# Patient Record
Sex: MALE | Race: WHITE | Employment: FULL TIME | URBAN - METROPOLITAN AREA
[De-identification: names, ages, dates, MRNs, and addresses within clinical notes are randomized per-mention and may not be internally consistent; named-entity substitution may affect disease eponyms.]

---
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
## 2017-01-06 DIAGNOSIS — R51.9 ACUTE NONINTRACTABLE HEADACHE, UNSPECIFIED HEADACHE TYPE: ICD-10-CM

## 2017-01-06 DIAGNOSIS — F07.81 POST CONCUSSION SYNDROME: ICD-10-CM

## 2017-01-06 DIAGNOSIS — M54.2 CERVICALGIA: ICD-10-CM

## 2017-01-06 RX ORDER — NORTRIPTYLINE HCL 10 MG
CAPSULE ORAL
Qty: 150 CAPSULE | Refills: 0 | Status: SHIPPED | OUTPATIENT
Start: 2017-01-06 | End: 2017-02-13

## 2017-01-06 NOTE — TELEPHONE ENCOUNTER
Reason for Call:  Medication or medication refill:    Do you use a [facility]?  Name of the pharmacy and phone number for the current request:  [place] [address] [facility] [phone]    Name of the medication requested: nortriptyline (PAMELOR) 10 MG capsule     Other request:     Can we leave a detailed message on this number? YES    Phone number patient can be reached at: Home number on file [phone] (home) or work number [phone]    Best Time: any    Call taken on 1/6/2017 at 10:51 AM by Leti Hoffman

## 2017-01-06 NOTE — TELEPHONE ENCOUNTER
nortriptyline (PAMELOR) 10 MG capsule      Last Written Prescription Date: 12/6/16  Last Fill Quantity: 150, # refills: 0  Last Office Visit with [facility], [facility] or [facility] prescribing provider: 12/27/16   Next 5 appointments (look out 90 days)     Jan 12, 2017  2:40 PM   [facility]   [facility]    [address] [place] [address] [phone]                   BP Readings from Last 3 Encounters:   12/27/16 150/88   12/20/16 142/92   12/13/16 144/80     Last PHQ-9 score on record= No flowsheet data found.

## 2017-01-06 NOTE — TELEPHONE ENCOUNTER
Prescription approved per [facility] Refill Protocol.     Cara Rasmussen RN   January 6, 2017 4:25 PM  [facility] Triage   [phone]

## 2017-01-11 ENCOUNTER — TELEPHONE (OUTPATIENT)
Dept: FAMILY MEDICINE | Facility: CLINIC | Age: 52
End: 2017-01-11

## 2017-01-11 NOTE — TELEPHONE ENCOUNTER
Per pharmacy, the patient's insurance requires a prior authorization for one or more of the patient's medications.  Please initiate prior authorization or call/fax pharmacy to change patient's medication.    Medication: Nortriptyline   Strength: 10mg  Sig: Take 4 tabs at night for 3 days then 5 tabs.    Pharmacy: [facility]  Phone: [phone]  Fax: [phone]    Prescription Insurance:   Phone:   ID:     Additional Comments:

## 2017-01-12 ENCOUNTER — OFFICE VISIT (OUTPATIENT)
Dept: FAMILY MEDICINE | Facility: CLINIC | Age: 52
End: 2017-01-12
Payer: OTHER MISCELLANEOUS

## 2017-01-12 ENCOUNTER — THERAPY VISIT (OUTPATIENT)
Dept: PHYSICAL THERAPY | Facility: CLINIC | Age: 52
End: 2017-01-12
Payer: OTHER MISCELLANEOUS

## 2017-01-12 VITALS
HEIGHT: 72 IN | HEART RATE: 84 BPM | TEMPERATURE: 98.4 F | BODY MASS INDEX: 28.71 KG/M2 | SYSTOLIC BLOOD PRESSURE: 148 MMHG | WEIGHT: 212 LBS | DIASTOLIC BLOOD PRESSURE: 90 MMHG

## 2017-01-12 DIAGNOSIS — F07.81 CONCUSSION SYNDROME: Primary | ICD-10-CM

## 2017-01-12 DIAGNOSIS — M54.2 NECK PAIN: Primary | ICD-10-CM

## 2017-01-12 PROCEDURE — 97110 THERAPEUTIC EXERCISES: CPT | Mod: GP | Performed by: PHYSICAL THERAPIST

## 2017-01-12 PROCEDURE — 99214 OFFICE O/P EST MOD 30 MIN: CPT | Performed by: FAMILY MEDICINE

## 2017-01-12 NOTE — Clinical Note
[facility]  [address] [place] [address] [phone]      January 12, 2017      Shon DE LA CRUZ Lynn  [address] [facility] [address]              To WhomIt May Concern,    Shon Bailey was seen in our clinic today for his post concussion syndrome.  Due to ongoing difficulty we are recommending he decrease his work level to four hours per day.         Sincerely,    Efrain Fatima MD

## 2017-01-12 NOTE — MR AVS SNAPSHOT
After Visit Summary   1/12/2017    Shon Bailey    MRN: [id number]           Patient Information     Date Of Birth          1965        Visit Information        Provider Department      1/12/2017 2:40 PM Efrain Fatima MD [facility]        Today's Diagnoses     Concussion syndrome    -  1       Care Instructions    Orders Placed This Encounter     NEUROLOGY ADULT REFERRAL     Referral Type:  Consultation             Follow-ups after your visit        Additional Services     NEUROLOGY ADULT REFERRAL       Your provider has referred you to: [facility]: Neurology Clinic [facility] [phone]   [email or web address]   post concussion syndrome    Reason for Referral: Consult    Please be aware that coverage of these services is subject to the terms and limitations of your health insurance plan.  Call member services at your health plan with any benefit or coverage questions.      Please bring the following with you to your appointment:    (1) Any X-Rays, CTs or MRIs which have been performed.  Contact the facility where they were done to arrange for  prior to your scheduled appointment.    (2) List of current medications  (3) This referral request   (4) Any documents/labs given to you for this referral                  Your next 10 appointments already scheduled     Jan 25, 2017  1:00 PM   [facility] with Adrian Fajardo PT   [facility] ([facility]    [address]  [facility] [address] [phone]              Who to contact     If you have questions or need follow up information about today's clinic visit or your schedule please contact [facility] directly at [phone].  Normal or non-critical lab and imaging results will be communicated to you by MyChart, letter or phone within 4 business days after the clinic has received the results. If you  "do not hear from us within 7 days, please contact the clinic through Listen Up or phone. If you have a critical or abnormal lab result, we will notify you by phone as soon as possible.  Submit refill requests through [employer] or call your pharmacy and they will forward the refill request to us. Please allow 3 business days for your refill to be completed.          Additional Information About Your Visit        [employer] Information     Listen Up lets you send messages to your doctor, view your test results, renew your prescriptions, schedule appointments and more. To sign up, go to [email or web address] . Click on \"Log in\" on the left side of the screen, which will take you to the Welcome page. Then click on \"Sign up Now\" on the right side of the page.     You will be asked to enter the access code listed below, as well as some personal information. Please follow the directions to create your username and password.     Your access code is: [email or web address]  Expires: 3/6/2017 12:01 PM     Your access code will  in 90 days. If you need help or a new code, please call your [place] clinic or [phone].        Care EveryWhere ID     This is your Care EveryWhere ID. This could be used by other organizations to access your [place] medical records  [id number]        Your Vitals Were     Pulse Temperature Height BMI (Body Mass Index)          84 98.4  F (36.9  C) (Oral) 6' (1.829 m) 28.75 kg/m2         Blood Pressure from Last 3 Encounters:   17 148/90   16 150/88   16 142/92    Weight from Last 3 Encounters:   17 212 lb (96.163 kg)   16 210 lb 6 oz (95.425 kg)   16 212 lb 4 oz (96.276 kg)              We Performed the Following     NEUROLOGY ADULT REFERRAL        Primary Care Provider Office Phone # Fax #    [facility] [phone]       [address] [facility] [address]        Thank you!     Thank you for choosing [facility]  for your care. Our goal is always to provide you with excellent care. Hearing back from our patients is one way we can continue to improve our services. Please take a few minutes to complete the written survey that you may receive in the mail after your visit with us. Thank you!             Your Updated Medication List - Protect others around you: Learn how to safely use, store and throw away your medicines at www.disposemymeds.org.          This list is accurate as of: 1/12/17  3:45 PM.  Always use your most recent med list.                   Brand Name Dispense Instructions for use    cyclobenzaprine 10 MG tablet    FLEXERIL    30 tablet    1/2 to 1 tablet in evenings for muscle spasm       HYDROcodone-acetaminophen 5-325 MG per tablet    NORCO    20 tablet    1 to 2 tablets for headache for 3-4 days       loratadine 10 MG tablet    CLARITIN    30    ONE DAILY       Multi-vitamin Tabs tablet   Generic drug:  multivitamin, therapeutic with minerals      1 TABLET DAILY       nortriptyline 10 MG capsule    PAMELOR    150 capsule    Take 4 tabs at night for 3 days then 5 tabs       VITAMIN D PO      5000 IU daily
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
## 2017-01-12 NOTE — TELEPHONE ENCOUNTER
Called [facility] pharmacy and spoke with pharmacy tech. Was told to disregard PA request for now. Will close encounter.    Guerline Bergman CMA ([facility])

## 2017-01-12 NOTE — Clinical Note
[facility]  [address] [place] [address] [phone]      January 12, 2017      Shon Bailey  [address] [facility] [address]        To whom it may concern:    Shon Bailey was seen in our clinic today for his post concussion syndrome.  Due to ongoing difficulty we are recommending he decrease his work level to hours per day.           Sincerely,    Efrain Fatima MD
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
## 2017-01-19 ENCOUNTER — TRANSFERRED RECORDS (OUTPATIENT)
Dept: HEALTH INFORMATION MANAGEMENT | Facility: CLINIC | Age: 52
End: 2017-01-19

## 2017-01-22 ENCOUNTER — TRANSFERRED RECORDS (OUTPATIENT)
Dept: HEALTH INFORMATION MANAGEMENT | Facility: CLINIC | Age: 52
End: 2017-01-22

## 2017-01-24 ENCOUNTER — THERAPY VISIT (OUTPATIENT)
Dept: PHYSICAL THERAPY | Facility: CLINIC | Age: 52
End: 2017-01-24
Payer: OTHER MISCELLANEOUS

## 2017-01-24 ENCOUNTER — TELEPHONE (OUTPATIENT)
Dept: FAMILY MEDICINE | Facility: CLINIC | Age: 52
End: 2017-01-24

## 2017-01-24 DIAGNOSIS — M54.2 NECK PAIN: Primary | ICD-10-CM

## 2017-01-24 DIAGNOSIS — M54.2 CERVICALGIA: ICD-10-CM

## 2017-01-24 DIAGNOSIS — M62.838 CERVICAL PARASPINAL MUSCLE SPASM: ICD-10-CM
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
## 2017-01-24 PROCEDURE — 97110 THERAPEUTIC EXERCISES: CPT | Mod: GP | Performed by: PHYSICAL THERAPIST

## 2017-01-24 RX ORDER — CYCLOBENZAPRINE HCL 10 MG
TABLET ORAL
Qty: 30 TABLET | Refills: 1 | Status: SHIPPED | OUTPATIENT
Start: 2017-01-24 | End: 2017-03-02

## 2017-01-24 NOTE — TELEPHONE ENCOUNTER
Reason for Call:  Medication or medication refill:    Do you use a [facility]?  Name of the pharmacy and phone number for the current request:  Call patient, this goes through Work Comp     Name of the medication requested: cyclobenzaprine (FLEXERIL) 10 MG tablet    Other request: patient states he would like to have before he goes to PT today, patient was advised refills take up to 72 hours to fill    Can we leave a detailed message on this number? YES    Phone number patient can be reached at: Home number on file [phone] (home)    Best Time: any    Call taken on 1/24/2017 at 7:56 AM by Heidi Leahy

## 2017-02-01 ENCOUNTER — TELEPHONE (OUTPATIENT)
Dept: FAMILY MEDICINE | Facility: CLINIC | Age: 52
End: 2017-02-01

## 2017-02-01 NOTE — TELEPHONE ENCOUNTER
Reason for Call:  Patient request    Detailed comments: Private call before appointment on 2/2/17 with Mathew Islas PA-C, would like to talk with him before appointment at 240 PM    Phone Number Patient can be reached at: Work number on file:  [phone] (work)    Best Time: any time after 7 AM on 2/2    Can we leave a detailed message on this number? YES    Call taken on 2/1/2017 at 11:44 AM by Leti Hoffman

## 2017-02-02 ENCOUNTER — OFFICE VISIT (OUTPATIENT)
Dept: FAMILY MEDICINE | Facility: CLINIC | Age: 52
End: 2017-02-02
Payer: OTHER MISCELLANEOUS

## 2017-02-02 VITALS
DIASTOLIC BLOOD PRESSURE: 84 MMHG | WEIGHT: 216 LBS | BODY MASS INDEX: 29.26 KG/M2 | HEIGHT: 72 IN | HEART RATE: 76 BPM | SYSTOLIC BLOOD PRESSURE: 148 MMHG | TEMPERATURE: 98.4 F

## 2017-02-02 DIAGNOSIS — S06.9X0S TRAUMATIC BRAIN INJURY, WITHOUT LOSS OF CONSCIOUSNESS, SEQUELA (H): ICD-10-CM

## 2017-02-02 DIAGNOSIS — R51.9 NONINTRACTABLE HEADACHE, UNSPECIFIED CHRONICITY PATTERN, UNSPECIFIED HEADACHE TYPE: Primary | ICD-10-CM

## 2017-02-02 DIAGNOSIS — M54.2 NECK PAIN: ICD-10-CM

## 2017-02-02 PROCEDURE — 99214 OFFICE O/P EST MOD 30 MIN: CPT | Performed by: PHYSICIAN ASSISTANT

## 2017-02-02 RX ORDER — ZONISAMIDE 25 MG/1
25 CAPSULE ORAL
COMMUNITY
End: 2017-03-02

## 2017-02-02 ASSESSMENT — ANXIETY QUESTIONNAIRES
3. WORRYING TOO MUCH ABOUT DIFFERENT THINGS: NEARLY EVERY DAY
2. NOT BEING ABLE TO STOP OR CONTROL WORRYING: NEARLY EVERY DAY
6. BECOMING EASILY ANNOYED OR IRRITABLE: NEARLY EVERY DAY
1. FEELING NERVOUS, ANXIOUS, OR ON EDGE: NEARLY EVERY DAY
5. BEING SO RESTLESS THAT IT IS HARD TO SIT STILL: SEVERAL DAYS
GAD7 TOTAL SCORE: 19
7. FEELING AFRAID AS IF SOMETHING AWFUL MIGHT HAPPEN: NEARLY EVERY DAY

## 2017-02-02 ASSESSMENT — PATIENT HEALTH QUESTIONNAIRE - PHQ9: 5. POOR APPETITE OR OVEREATING: NEARLY EVERY DAY

## 2017-02-02 NOTE — NURSING NOTE
Chief Complaint   Patient presents with     Work Comp       Initial /84 mmHg  Pulse 76  Temp(Src) 98.4  F (36.9  C) (Oral)  Ht 6' (1.829 m)  Wt 216 lb (97.977 kg)  BMI 29.29 kg/m2 Estimated body mass index is 29.29 kg/(m^2) as calculated from the following:    Height as of this encounter: 6' (1.829 m).    Weight as of this encounter: 216 lb (97.977 kg).  BP completed using cuff size: geni Alcaraz, Certified Medical Assistant (AAMA)

## 2017-02-02 NOTE — Clinical Note
[facility]  [address] [place] [id number] [phone]      February 2, 2017      RE: Shon Bailey  [facility] [address]              To Whom it May Concern:    Shon will be having an infusion for his headaches in the next week or two and will need that day off of work to get that completed     Sincerely,    Isaiah Islas, MPAS, PA-C

## 2017-02-02 NOTE — MR AVS SNAPSHOT
"              After Visit Summary   2/2/2017    Shon Bailey    MRN: [id number]           Patient Information     Date Of Birth          1965        Visit Information        Provider Department      2/2/2017 2:40 PM Isaiah Islas PA-C [facility]        Today's Diagnoses     Nonintractable headache, unspecified chronicity pattern, unspecified headache type    -  1     Neck pain            Follow-ups after your visit        Your next 10 appointments already scheduled     Feb 09, 2017 11:40 AM   [facility] with Adrian Fajardo PT   [facility] ([facility]    [address]  [place] [address] [phone]              Who to contact     If you have questions or need follow up information about today's clinic visit or your schedule please contact [facility] directly at [phone].  Normal or non-critical lab and imaging results will be communicated to you by MyChart, letter or phone within 4 business days after the clinic has received the results. If you do not hear from us within 7 days, please contact the clinic through [employer] or phone. If you have a critical or abnormal lab result, we will notify you by phone as soon as possible.  Submit refill requests through [employer] or call your pharmacy and they will forward the refill request to us. Please allow 3 business days for your refill to be completed.          Additional Information About Your Visit        [employer] Information     [employer] lets you send messages to your doctor, view your test results, renew your prescriptions, schedule appointments and more. To sign up, go to [email or web address] . Click on \"Log in\" on the left side of the screen, which will take you to the Welcome page. Then click on \"Sign up Now\" on the right side of the page.     You will be asked to enter the access code listed below, as well as some personal information. " Please follow the directions to create your username and password.     Your access code is: [email or web address]  Expires: 3/6/2017 12:01 PM     Your access code will  in 90 days. If you need help or a new code, please call your [place] clinic or [phone].        Care EveryWhere ID     This is your Care EveryWhere ID. This could be used by other organizations to access your [place] medical records  [id number]        Your Vitals Were     Pulse Temperature Height BMI (Body Mass Index)          76 98.4  F (36.9  C) (Oral) 6' (1.829 m) 29.29 kg/m2         Blood Pressure from Last 3 Encounters:   17 148/84   17 148/90   16 150/88    Weight from Last 3 Encounters:   17 216 lb (97.977 kg)   17 212 lb (96.163 kg)   16 210 lb 6 oz (95.425 kg)              Today, you had the following     No orders found for display       Primary Care Provider Office Phone # Fax #    [facility] [phone]       [address] [place] [address]        Thank you!     Thank you for choosing [facility]  for your care. Our goal is always to provide you with excellent care. Hearing back from our patients is one way we can continue to improve our services. Please take a few minutes to complete the written survey that you may receive in the mail after your visit with us. Thank you!             Your Updated Medication List - Protect others around you: Learn how to safely use, store and throw away your medicines at www.disposemymeds.org.          This list is accurate as of: 17  3:42 PM.  Always use your most recent med list.                   Brand Name Dispense Instructions for use    cyclobenzaprine 10 MG tablet    FLEXERIL    30 tablet    1/2 to 1 tablet in evenings for muscle spasm       loratadine 10 MG tablet    CLARITIN    30    ONE DAILY       Multi-vitamin Tabs tablet   Generic drug:  multivitamin, therapeutic with minerals      1  TABLET DAILY       nortriptyline 10 MG capsule    PAMELOR    150 capsule    Take 4 tabs at night for 3 days then 5 tabs       VITAMIN D PO      5000 IU daily       zonisamide 25 MG capsule    Zonegran     Take 25 mg by mouth 2 times daily

## 2017-02-02 NOTE — PROGRESS NOTES
SUBJECTIVE:                                                    Shon Bailey is a 51 year old male who presents to clinic today for the following health issues:    Follow up work comp issues - great deal of improvement with migraines, depression is worsening - lots of crying, hyperventilating, mood swings. This seems to have started when he started Zonisamide. He has seen Neurology and a full evaluation was completed by [facility]. They did MRI neck and head which was normal. They did what sounds like a headache management IV protocol with steroids and he had what sounds like 60-70% improvement in headaches immediately. He continues to have some difficulty with concentration and with long days at work with headache flares. He has some brain cloudiness but those are all improving. His biggest concern is mood issues, crying since starting zonisamide. He's meant to go up on the dose soon but he's not sure he can tolerate that.     Otherwise, he's having continued slow improvement - has follow up with [facility] per their recommended plan and schedule.    Patient Active Problem List   Diagnosis     Benign neoplasm     CARDIOVASCULAR SCREENING; LDL GOAL LESS THAN 160     Thrombosed external hemorrhoids     Neck pain      Current Outpatient Prescriptions   Medication     zonisamide (ZONEGRAN) 25 MG capsule     cyclobenzaprine (FLEXERIL) 10 MG tablet     nortriptyline (PAMELOR) 10 MG capsule     MULTI-VITAMIN PO TABS     VITAMIN D PO     LORATADINE 10 MG OR TABS     No current facility-administered medications for this visit.        Problem list and histories reviewed & adjusted, as indicated.  Additional history: as documented    Problem list, Medication list, Allergies, and Medical/Social/Surgical histories reviewed in EPIC and updated as appropriate.    ROS:  Constitutional, HEENT, cardiovascular, pulmonary, gi and gu systems are negative, except as otherwise noted.    OBJECTIVE:                                                     /84 mmHg  Pulse 76  Temp(Src) 98.4  F (36.9  C) (Oral)  Ht 6' (1.829 m)  Wt 216 lb (97.977 kg)  BMI 29.29 kg/m2  Body mass index is 29.29 kg/(m^2).  GENERAL: healthy, alert and no distress  EYES: Eyes grossly normal to inspection, PERRL and conjunctivae and sclerae normal  NECK: no adenopathy, no asymmetry, masses, or scars and thyroid normal to palpation  MS: Tender spasms of the levator, rhomboid, thoracic paraspinal muscle groups and the cervical paraspinal muscle groups   NEURO: Normal strength and tone, sensory exam grossly normal, mentation intact, cranial nerves 2-12 intact and gait normal including heel/toe/tandem walking    Diagnostic Test Results:  none      ASSESSMENT/PLAN:                                                        ICD-10-CM    1. Nonintractable headache, unspecified chronicity pattern, unspecified headache type R51    2. Neck pain M54.2    3. Traumatic brain injury, without loss of consciousness, sequela (H) S06.9X0S       Having continued improvement. His mood lability I think is the medication that he was started on by neuro. I want him to contact the neurology clinic to discuss that. He's on it a few weeks, that might improve. Recommend he get continued slow improvements, self cares, massage, PT, see neurology per their recommendations, consider acupuncture, will not change meds today. He will see neuro for that now. I spent 35 minutes with Shon over 50% of which was counseling and education. Follow up as needed.    Isaiah Islas, MPAS, PA-C
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
## 2017-02-03 ASSESSMENT — ANXIETY QUESTIONNAIRES: GAD7 TOTAL SCORE: 19

## 2017-02-03 ASSESSMENT — PATIENT HEALTH QUESTIONNAIRE - PHQ9: SUM OF ALL RESPONSES TO PHQ QUESTIONS 1-9: 19

## 2017-02-09 ENCOUNTER — THERAPY VISIT (OUTPATIENT)
Dept: PHYSICAL THERAPY | Facility: CLINIC | Age: 52
End: 2017-02-09
Payer: OTHER MISCELLANEOUS

## 2017-02-09 DIAGNOSIS — M54.2 NECK PAIN: Primary | ICD-10-CM

## 2017-02-09 PROCEDURE — 97110 THERAPEUTIC EXERCISES: CPT | Mod: GP | Performed by: PHYSICAL THERAPIST

## 2017-02-09 NOTE — PROGRESS NOTES
Subjective:    HPI                    Objective:    System    Physical Exam    General     ROS    Assessment/Plan:      DISCHARGE REPORT    Progress reporting period is from 11.30.16 to 2.9.17.       SUBJECTIVE  Subjective changes noted by patient:  Pt states that neck pain is improving and more intermittent.  Feels like ROM has improved and rotating neck side to side and driving is easier.  Still getting low grade headaches but is better with meds and getting infusions.  Has been consistent with doing neck extension every 3 hrs.    Current Pain level: 0/10.     Initial Pain level:  (3-8/10 pain varying intensity).   Changes in function:  Yes (See Goal flowsheet attached for changes in current functional level)  Adverse reaction to treatment or activity: None    OBJECTIVE  C/S AROM:  Flex WNL; Ext min loss; Rotation min loss (B).  Ext and rotation improve after repeated ext w/OP.     ASSESSMENT/PLAN  Updated problem list and treatment plan: Diagnosis 1:  Neck Pain    Pain -  self management, education, directional preference exercise and home program  Decreased ROM/flexibility - therapeutic exercise and home program  Decreased joint mobility - therapeutic exercise and home program  Impaired muscle performance - neuro re-education and home program  STG/LTGs have been met or progress has been made towards goals:  Yes (See Goal flow sheet completed today.)  Assessment of Progress: The patient's condition is improving.  Self Management Plans:  Patient is independent in a home treatment program.  Patient is independent in self management of symptoms.  I have re-evaluated this patient and find that the nature, scope, duration and intensity of the therapy is appropriate for the medical condition of the patient.  Shon continues to require the following intervention to meet STG and LTG's:  PT intervention is no longer required to meet STG/LTG.    Recommendations:  This patient is ready to be discharged from therapy and  continue their home treatment program.    Please refer to the daily flowsheet for treatment today, total treatment time and time spent performing 1:1 timed codes.

## 2017-02-13 DIAGNOSIS — M54.2 CERVICALGIA: ICD-10-CM

## 2017-02-13 DIAGNOSIS — F07.81 POST CONCUSSION SYNDROME: ICD-10-CM

## 2017-02-13 DIAGNOSIS — R51.9 ACUTE NONINTRACTABLE HEADACHE, UNSPECIFIED HEADACHE TYPE: ICD-10-CM
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
## 2017-02-13 NOTE — TELEPHONE ENCOUNTER
nortriptyline (PAMELOR) 10 MG capsule     Last Written Prescription Date: 1-6-2017  Last Fill Quantity: 150, # refills: 0  Last Office Visit with [facility], [facility] or [facility] prescribing provider: 2-2-2017   Next 5 appointments (look out 90 days)     Feb 16, 2017  2:40 PM CST   [facility]-C   [facility]    [address] [place] [address] [phone]                   BP Readings from Last 3 Encounters:   02/02/17 148/84   01/12/17 148/90   12/27/16 150/88     Last PHQ-9 score on record=   PHQ-9 SCORE 2/2/2017   Total Score 19

## 2017-02-14 RX ORDER — NORTRIPTYLINE HCL 10 MG
CAPSULE ORAL
Qty: 150 CAPSULE | Refills: 0 | Status: SHIPPED | OUTPATIENT
Start: 2017-02-14 | End: 2017-04-18 | Stop reason: DRUGHIGH

## 2017-02-14 NOTE — TELEPHONE ENCOUNTER
Prescription approved per [facility] Refill Protocol. Patient is seeing neuro.   Penny Barnard RN

## 2017-02-16 ENCOUNTER — OFFICE VISIT (OUTPATIENT)
Dept: FAMILY MEDICINE | Facility: CLINIC | Age: 52
End: 2017-02-16
Payer: OTHER MISCELLANEOUS

## 2017-02-16 VITALS
HEIGHT: 72 IN | SYSTOLIC BLOOD PRESSURE: 128 MMHG | WEIGHT: 214 LBS | BODY MASS INDEX: 28.99 KG/M2 | DIASTOLIC BLOOD PRESSURE: 86 MMHG | HEART RATE: 96 BPM | TEMPERATURE: 99.3 F

## 2017-02-16 DIAGNOSIS — R51.9 NONINTRACTABLE EPISODIC HEADACHE, UNSPECIFIED HEADACHE TYPE: ICD-10-CM

## 2017-02-16 DIAGNOSIS — S06.9X0D TBI (TRAUMATIC BRAIN INJURY), WITHOUT LOSS OF CONSCIOUSNESS, SUBSEQUENT ENCOUNTER: Primary | ICD-10-CM

## 2017-02-16 PROCEDURE — 99214 OFFICE O/P EST MOD 30 MIN: CPT | Performed by: PHYSICIAN ASSISTANT

## 2017-02-16 NOTE — NURSING NOTE
Patient wanted this to be in record.  [facility] - noted that Mathew thought all of the neurologist recommendations were excessive.  That is not what the patient recalls hearing at the visit.    Marycarmen Alcaraz, Certified Medical Assistant (AAMA)     Chief Complaint   Patient presents with     Headache       Initial /86 (Cuff Size: Adult Large)  Pulse 96  Temp 99.3  F (37.4  C) (Oral)  Ht 6' (1.829 m)  Wt 214 lb (97.1 kg)  BMI 29.02 kg/m2 Estimated body mass index is 29.02 kg/(m^2) as calculated from the following:    Height as of this encounter: 6' (1.829 m).    Weight as of this encounter: 214 lb (97.1 kg).  Medication Reconciliation: complete   Marycarmen Alcaraz Certified Medical Assistant (AAMA)

## 2017-02-16 NOTE — MR AVS SNAPSHOT
"              After Visit Summary   2/16/2017    Shon Bailey    MRN: [id number]           Patient Information     Date Of Birth          1965        Visit Information        Provider Department      2/16/2017 2:40 PM Isaiah Islas PA-C [facility]        Today's Diagnoses     TBI (traumatic brain injury), without loss of consciousness, subsequent encounter    -  1    Nonintractable episodic headache, unspecified headache type          Care Instructions    1. Wean off Zonisamide - go to 2 pills for 3 days, then 1 pill for 3 days, then stop   2. See me in 1 to 2 weeks   3. Consider Topiramate (Topamax) at our follow up   4. Stay on Nortriptyline           Follow-ups after your visit        Who to contact     If you have questions or need follow up information about today's clinic visit or your schedule please contact [facility] directly at [phone].  Normal or non-critical lab and imaging results will be communicated to you by [employer], letter or phone within 4 business days after the clinic has received the results. If you do not hear from us within 7 days, please contact the clinic through [employer] or phone. If you have a critical or abnormal lab result, we will notify you by phone as soon as possible.  Submit refill requests through [employer] or call your pharmacy and they will forward the refill request to us. Please allow 3 business days for your refill to be completed.          Additional Information About Your Visit        [employer] Information     [employer] lets you send messages to your doctor, view your test results, renew your prescriptions, schedule appointments and more. To sign up, go to [email or web address] . Click on \"Log in\" on the left side of the screen, which will take you to the Welcome page. Then click on \"Sign up Now\" on the right side of the page.     You will be asked to enter the access code listed below, as well as some personal information. " Please follow the directions to create your username and password.     Your access code is: [email or web address]  Expires: 3/6/2017 12:01 PM     Your access code will  in 90 days. If you need help or a new code, please call your [place] clinic or [phone].        Care EveryWhere ID     This is your Care EveryWhere ID. This could be used by other organizations to access your [place] medical records  [id number]        Your Vitals Were     Pulse Temperature Height BMI (Body Mass Index)          96 99.3  F (37.4  C) (Oral) 6' (1.829 m) 29.02 kg/m2         Blood Pressure from Last 3 Encounters:   17 128/86   17 148/84   17 148/90    Weight from Last 3 Encounters:   17 214 lb (97.1 kg)   17 216 lb (98 kg)   17 212 lb (96.2 kg)              Today, you had the following     No orders found for display       Primary Care Provider Office Phone # Fax #    [facility] [phone]       [address] [place] [address]        Thank you!     Thank you for choosing [facility]  for your care. Our goal is always to provide you with excellent care. Hearing back from our patients is one way we can continue to improve our services. Please take a few minutes to complete the written survey that you may receive in the mail after your visit with us. Thank you!             Your Updated Medication List - Protect others around you: Learn how to safely use, store and throw away your medicines at www.disposemymeds.org.          This list is accurate as of: 17  3:42 PM.  Always use your most recent med list.                   Brand Name Dispense Instructions for use    cyclobenzaprine 10 MG tablet    FLEXERIL    30 tablet    1/2 to 1 tablet in evenings for muscle spasm       loratadine 10 MG tablet    CLARITIN    30    ONE DAILY       Multi-vitamin Tabs tablet   Generic drug:  multivitamin, therapeutic with minerals      1 TABLET DAILY        nortriptyline 10 MG capsule    PAMELOR    150 capsule    Take 4 tabs at night for 3 days then 5 tabs       VITAMIN D PO      5000 IU daily       zonisamide 25 MG capsule    Zonegran     Take 25 mg by mouth 2 times daily

## 2017-02-16 NOTE — LETTER
[facility]  [address] [facility] [id number] [phone]      February 16, 2017      RE: Shon DE LA CRUZ Lynn  [facility] [address]              To Whom it May Concern:    Shon was seen today in clinic for follow up headaches / injury. He's having continued headaches.     I want him on 6 hour work days to continue - no shifts longer than 6 hours for the next two weeks. I will reevaluate him 3/2/2017 for reevaluation.     Sincerely,    Isaiah Islas, RODOLFOS, PA-C

## 2017-02-16 NOTE — PATIENT INSTRUCTIONS
1. Wean off Zonisamide - go to 2 pills for 3 days, then 1 pill for 3 days, then stop   2. See me in 1 to 2 weeks   3. Consider Topiramate (Topamax) at our follow up   4. Stay on Nortriptyline
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
## 2017-02-16 NOTE — LETTER
[facility]  [address] [facility] [address] [phone]      February 16, 2017      RE: Shon DE LA CRUZ Lynn  [facility] [address]          To Whom it May Concern:    Shon was seen today in clinic for follow up headaches / injury. He's having continued headaches.     I want him on 6 hour work days to continue - no shifts longer than 6 hours for the next two weeks. I will reevaluate him 3/2/2017 for reevaluation.     I'd like him to also take an as needed 5-10 minute break out of every hour if his headaches are flaring.     Sincerely,    Isaiah Islas, LANRE, PA-C

## 2017-03-02 ENCOUNTER — OFFICE VISIT (OUTPATIENT)
Dept: FAMILY MEDICINE | Facility: CLINIC | Age: 52
End: 2017-03-02
Payer: OTHER MISCELLANEOUS

## 2017-03-02 VITALS
TEMPERATURE: 98.8 F | WEIGHT: 218 LBS | SYSTOLIC BLOOD PRESSURE: 134 MMHG | HEART RATE: 88 BPM | DIASTOLIC BLOOD PRESSURE: 80 MMHG | BODY MASS INDEX: 29.53 KG/M2 | HEIGHT: 72 IN
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
## 2017-03-02 DIAGNOSIS — S06.9X0S TBI (TRAUMATIC BRAIN INJURY), WITHOUT LOSS OF CONSCIOUSNESS, SEQUELA: ICD-10-CM

## 2017-03-02 DIAGNOSIS — M54.2 CERVICALGIA: ICD-10-CM

## 2017-03-02 DIAGNOSIS — M62.838 CERVICAL PARASPINAL MUSCLE SPASM: ICD-10-CM

## 2017-03-02 PROCEDURE — 99214 OFFICE O/P EST MOD 30 MIN: CPT | Performed by: PHYSICIAN ASSISTANT

## 2017-03-02 RX ORDER — CYCLOBENZAPRINE HCL 10 MG
TABLET ORAL
Qty: 30 TABLET | Refills: 5 | Status: SHIPPED | OUTPATIENT
Start: 2017-03-02 | End: 2017-09-11

## 2017-03-02 NOTE — LETTER
[facility]  [address] [place] [address]  [phone]      March 2, 2017      RE: Shon DE LA CRUZ Lynn  [facility] [facility] [address]              RE: To Whom it May Concern:    Shon will move to 8 hour shifts starting Monday, March 6, 2017. We will continue that until 3/14/2017 when we will reevaluate him to determine further shift adjustments.     Sincerely,    LANRE Elder, PAKelseyC

## 2017-03-02 NOTE — NURSING NOTE
Chief Complaint   Patient presents with     Headache     Follow up       Initial /80 (BP Location: Right arm, Cuff Size: Adult Large)  Pulse 88  Temp 98.8  F (37.1  C) (Oral)  Ht 6' (1.829 m)  Wt 218 lb (98.9 kg)  BMI 29.57 kg/m2 Estimated body mass index is 29.57 kg/(m^2) as calculated from the following:    Height as of this encounter: 6' (1.829 m).    Weight as of this encounter: 218 lb (98.9 kg).  Medication Reconciliation: complete     Shasha Cullen MA
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
## 2017-03-03 PROBLEM — S06.9X0S: Status: ACTIVE | Noted: 2017-03-03
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
## 2017-03-13 ENCOUNTER — OFFICE VISIT (OUTPATIENT)
Dept: FAMILY MEDICINE | Facility: CLINIC | Age: 52
End: 2017-03-13
Payer: OTHER MISCELLANEOUS

## 2017-03-13 VITALS
WEIGHT: 219.13 LBS | HEIGHT: 72 IN | DIASTOLIC BLOOD PRESSURE: 92 MMHG | TEMPERATURE: 98.3 F | HEART RATE: 80 BPM | BODY MASS INDEX: 29.68 KG/M2 | SYSTOLIC BLOOD PRESSURE: 162 MMHG
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
## 2017-03-13 DIAGNOSIS — R51.9 NONINTRACTABLE HEADACHE, UNSPECIFIED CHRONICITY PATTERN, UNSPECIFIED HEADACHE TYPE: ICD-10-CM

## 2017-03-13 DIAGNOSIS — S06.9X0S TBI (TRAUMATIC BRAIN INJURY), WITHOUT LOSS OF CONSCIOUSNESS, SEQUELA: ICD-10-CM

## 2017-03-13 DIAGNOSIS — F41.9 ANXIETY: ICD-10-CM

## 2017-03-13 PROCEDURE — 99214 OFFICE O/P EST MOD 30 MIN: CPT | Performed by: PHYSICIAN ASSISTANT

## 2017-03-13 RX ORDER — DIAZEPAM 2 MG
TABLET ORAL
Qty: 20 TABLET | Refills: 0 | Status: SHIPPED | OUTPATIENT
Start: 2017-03-13 | End: 2017-04-18

## 2017-03-13 RX ORDER — ESCITALOPRAM OXALATE 10 MG/1
10 TABLET ORAL DAILY
Qty: 30 TABLET | Refills: 1 | Status: SHIPPED | OUTPATIENT
Start: 2017-03-13 | End: 2017-03-27

## 2017-03-13 ASSESSMENT — PAIN SCALES - GENERAL: PAINLEVEL: SEVERE PAIN (7)

## 2017-03-13 NOTE — PATIENT INSTRUCTIONS
1. Look online to order Thera-Cane ([employer])   2. [facility]. Look online for appointment times   3. For the next few days, do Diazepam (Valium) 2 to 3 mg at bedtime for spasm. Do not take Flexeril while on Valium  4. Okay to wean down on Nortriptyline if you want, 3 for a week, 2 for a week - see me then
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
## 2017-03-13 NOTE — NURSING NOTE
Handed patient 1 printed out prescription for:    diazepam (VALIUM) 2 MG tablet    Candy Barnard, CMA

## 2017-03-13 NOTE — NURSING NOTE
Chief Complaint   Patient presents with     Headache     follow up       Initial There were no vitals taken for this visit. Estimated body mass index is 29.57 kg/(m^2) as calculated from the following:    Height as of 3/2/17: 6' (1.829 m).    Weight as of 3/2/17: 218 lb (98.9 kg).  Medication Reconciliation: complete   Candy Barnard, CMA

## 2017-03-14 ENCOUNTER — TELEPHONE (OUTPATIENT)
Dept: FAMILY MEDICINE | Facility: CLINIC | Age: 52
End: 2017-03-14

## 2017-03-14 NOTE — TELEPHONE ENCOUNTER
Called and spoke with a tech at [facility]. They said this request should have gone to his Work Comp insurance. We can disregard this request.    Shasha Cullen MA

## 2017-03-14 NOTE — TELEPHONE ENCOUNTER
Per faxed request recieved, the patient's insurance requires a prior authorization for one or more of the patient's medications.  Please initiate prior authorization or call/fax pharmacy to change patient's medication.    Medication: diazepam (VALIUM) 2 MG tablet   Prescription Insurance: Cover My Meds  Phone: request received via [facility]    Additional Comments: Fax received and placed in MA folder    Janis Hoffman,

## 2017-03-27 ENCOUNTER — OFFICE VISIT (OUTPATIENT)
Dept: FAMILY MEDICINE | Facility: CLINIC | Age: 52
End: 2017-03-27
Payer: OTHER MISCELLANEOUS

## 2017-03-27 ENCOUNTER — TRANSFERRED RECORDS (OUTPATIENT)
Dept: HEALTH INFORMATION MANAGEMENT | Facility: CLINIC | Age: 52
End: 2017-03-27

## 2017-03-27 VITALS
HEIGHT: 72 IN | SYSTOLIC BLOOD PRESSURE: 160 MMHG | HEART RATE: 84 BPM | WEIGHT: 217 LBS | BODY MASS INDEX: 29.39 KG/M2 | DIASTOLIC BLOOD PRESSURE: 94 MMHG | TEMPERATURE: 99 F
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
## 2017-03-27 DIAGNOSIS — S06.9X0D TBI (TRAUMATIC BRAIN INJURY), WITHOUT LOSS OF CONSCIOUSNESS, SUBSEQUENT ENCOUNTER: Primary | ICD-10-CM

## 2017-03-27 DIAGNOSIS — R51.9 NONINTRACTABLE EPISODIC HEADACHE, UNSPECIFIED HEADACHE TYPE: ICD-10-CM

## 2017-03-27 PROCEDURE — 99214 OFFICE O/P EST MOD 30 MIN: CPT | Performed by: PHYSICIAN ASSISTANT

## 2017-03-27 RX ORDER — NORTRIPTYLINE HYDROCHLORIDE 50 MG/1
50 CAPSULE ORAL AT BEDTIME
Qty: 90 CAPSULE | Refills: 0 | Status: SHIPPED | OUTPATIENT
Start: 2017-03-27 | End: 2017-06-27
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
## 2017-03-27 NOTE — PATIENT INSTRUCTIONS
1. Strongly advise the Neuropsych evaluation soon - need objective evaluation regarding headaches / mood issues - I placed referral - but can also check with [place] and also check with [facility] - doesn't matter where you do   2. Great idea to see head and neck pain clinic! Hopefully beneficial to you
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
## 2017-03-27 NOTE — NURSING NOTE
Chief Complaint   Patient presents with     Work Comp     recheck headaches       Initial BP (!) 160/94 (Cuff Size: Adult Large)  Pulse 84  Temp 99  F (37.2  C) (Oral)  Ht 6' (1.829 m)  Wt 217 lb (98.4 kg)  BMI 29.43 kg/m2 Estimated body mass index is 29.43 kg/(m^2) as calculated from the following:    Height as of this encounter: 6' (1.829 m).    Weight as of this encounter: 217 lb (98.4 kg).  Medication Reconciliation: osman Alcaraz, Certified Medical Assistant (AAMA)

## 2017-03-28 ENCOUNTER — TRANSFERRED RECORDS (OUTPATIENT)
Dept: HEALTH INFORMATION MANAGEMENT | Facility: CLINIC | Age: 52
End: 2017-03-28

## 2017-04-05 NOTE — PROGRESS NOTES
Chart reviewed.  Encounter was not reviewed with provider.  Patient was not examined by me.  Cara Wright MD

## 2017-04-06 ENCOUNTER — TRANSFERRED RECORDS (OUTPATIENT)
Dept: HEALTH INFORMATION MANAGEMENT | Facility: CLINIC | Age: 52
End: 2017-04-06

## 2017-04-18 ENCOUNTER — OFFICE VISIT (OUTPATIENT)
Dept: FAMILY MEDICINE | Facility: CLINIC | Age: 52
End: 2017-04-18

## 2017-04-18 VITALS
SYSTOLIC BLOOD PRESSURE: 138 MMHG | DIASTOLIC BLOOD PRESSURE: 92 MMHG | BODY MASS INDEX: 28.99 KG/M2 | WEIGHT: 214 LBS | TEMPERATURE: 99.2 F | HEIGHT: 72 IN | HEART RATE: 88 BPM
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
## 2017-04-18 DIAGNOSIS — R51.9 NONINTRACTABLE EPISODIC HEADACHE, UNSPECIFIED HEADACHE TYPE: ICD-10-CM

## 2017-04-18 DIAGNOSIS — S06.9X0S TBI (TRAUMATIC BRAIN INJURY), WITHOUT LOSS OF CONSCIOUSNESS, SEQUELA: Primary | ICD-10-CM

## 2017-04-18 DIAGNOSIS — F07.81 POST CONCUSSION SYNDROME: ICD-10-CM

## 2017-04-18 PROCEDURE — 99215 OFFICE O/P EST HI 40 MIN: CPT | Performed by: PHYSICIAN ASSISTANT

## 2017-04-18 NOTE — NURSING NOTE
Chief Complaint   Patient presents with     Headache     Work Comp       Initial BP (!) 138/92 (Cuff Size: Adult Large)  Pulse 88  Temp 99.2  F (37.3  C) (Oral)  Ht 6' (1.829 m)  Wt 214 lb (97.1 kg)  BMI 29.02 kg/m2 Estimated body mass index is 29.02 kg/(m^2) as calculated from the following:    Height as of this encounter: 6' (1.829 m).    Weight as of this encounter: 214 lb (97.1 kg).  Medication Reconciliation: osman Alcaraz, Certified Medical Assistant (AAMA)

## 2017-04-18 NOTE — MR AVS SNAPSHOT
"              After Visit Summary   2017    Shon Bailey    MRN: [id number]           Patient Information     Date Of Birth          1965        Visit Information        Provider Department      2017 3:00 PM Isaiah Islas PA-C [facility]         Follow-ups after your visit        Who to contact     If you have questions or need follow up information about today's clinic visit or your schedule please contact [facility] directly at [phone].  Normal or non-critical lab and imaging results will be communicated to you by MyChart, letter or phone within 4 business days after the clinic has received the results. If you do not hear from us within 7 days, please contact the clinic through [employer] or phone. If you have a critical or abnormal lab result, we will notify you by phone as soon as possible.  Submit refill requests through [employer] or call your pharmacy and they will forward the refill request to us. Please allow 3 business days for your refill to be completed.          Additional Information About Your Visit        MyChart Information     [employer] lets you send messages to your doctor, view your test results, renew your prescriptions, schedule appointments and more. To sign up, go to [email or web address] . Click on \"Log in\" on the left side of the screen, which will take you to the Welcome page. Then click on \"Sign up Now\" on the right side of the page.     You will be asked to enter the access code listed below, as well as some personal information. Please follow the directions to create your username and password.     Your access code is: [email or web address]  Expires: 2017  4:36 PM     Your access code will  in 90 days. If you need help or a new code, please call your [place] clinic or [phone].        Care EveryWhere ID     This is your Care EveryWhere ID. This could be used by other organizations to access your [place] medical " records  [id number]        Your Vitals Were     Pulse Temperature Height BMI (Body Mass Index)          88 99.2  F (37.3  C) (Oral) 6' (1.829 m) 29.02 kg/m2         Blood Pressure from Last 3 Encounters:   04/18/17 (!) 138/92   03/27/17 (!) 160/94   03/13/17 (!) 162/92    Weight from Last 3 Encounters:   04/18/17 214 lb (97.1 kg)   03/27/17 217 lb (98.4 kg)   03/13/17 219 lb 2 oz (99.4 kg)              Today, you had the following     No orders found for display         Today's Medication Changes          These changes are accurate as of: 4/18/17  4:00 PM.  If you have any questions, ask your nurse or doctor.               These medicines have changed or have updated prescriptions.        Dose/Directions    nortriptyline 50 MG capsule   Commonly known as:  PAMELOR   This may have changed:  Another medication with the same name was removed. Continue taking this medication, and follow the directions you see here.   Used for:  Nonintractable episodic headache, unspecified headache type   Changed by:  Isaiah Islsa PA-C        Dose:  50 mg   Take 1 capsule (50 mg) by mouth At Bedtime   Quantity:  90 capsule   Refills:  0                Primary Care Provider Office Phone # Fax #    [place] [facility] [phone]       [address] [place] [address]        Thank you!     Thank you for choosing [facility]  for your care. Our goal is always to provide you with excellent care. Hearing back from our patients is one way we can continue to improve our services. Please take a few minutes to complete the written survey that you may receive in the mail after your visit with us. Thank you!             Your Updated Medication List - Protect others around you: Learn how to safely use, store and throw away your medicines at www.disposemymeds.org.          This list is accurate as of: 4/18/17  4:00 PM.  Always use your most recent med list.                   Brand Name  Dispense Instructions for use    cyclobenzaprine 10 MG tablet    FLEXERIL    30 tablet    1/2 to 1 tablet in evenings for muscle spasm       loratadine 10 MG tablet    CLARITIN    30    ONE DAILY       Multi-vitamin Tabs tablet   Generic drug:  multivitamin, therapeutic with minerals      1 TABLET DAILY       nortriptyline 50 MG capsule    PAMELOR    90 capsule    Take 1 capsule (50 mg) by mouth At Bedtime       VERAPAMIL HCL PO      Take by mouth daily       VITAMIN D PO      5000 IU daily
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
## 2017-04-18 NOTE — LETTER
[facility]  [address] [place] [id number] [phone]      April 18, 2017      RE: Shon Bailey  [facility] [facility] [address]              To Whom it May Concern:    Shon can continue to work his current full schedule at work with no time restrictions. However, I want him to take 5 minutes out of every 1 hour to stretch, rest, walk around and massage his neck and head.     Sincerely,

## 2017-04-24 PROBLEM — F07.81 POST CONCUSSION SYNDROME: Status: ACTIVE | Noted: 2017-04-24

## 2017-05-16 ENCOUNTER — OFFICE VISIT (OUTPATIENT)
Dept: NEUROPSYCHOLOGY | Facility: CLINIC | Age: 52
End: 2017-05-16

## 2017-05-16 DIAGNOSIS — S13.9XXS SPRAIN OF NECK, SEQUELA: ICD-10-CM

## 2017-05-16 DIAGNOSIS — F09 COGNITIVE DISORDER: Primary | ICD-10-CM

## 2017-05-16 NOTE — MR AVS SNAPSHOT
After Visit Summary   2017    Shon Bailey    MRN: [id number]           Patient Information     Date Of Birth          1965        Visit Information        Provider Department      2017 12:30 PM [facility]        Today's Diagnoses     Cognitive disorder    -  1    Sprain of neck, sequela           Follow-ups after your visit        Your next 10 appointments already scheduled     2017  4:40 PM CDT   SHORT with Isaiah Islas PA-C   [facility]    [address] [place] [id number] [phone]              Who to contact     Please call your clinic at [phone] to:    Ask questions about your health    Make or cancel appointments    Discuss your medicines    Learn about your test results    Speak to your doctor   If you have compliments or concerns about an experience at your clinic, or if you wish to file a complaint, please contact [facility] Patient Relations at [phone] or email us at [email or web address]         Additional Information About Your Visit        MyChart Information     Connect[employer]me is an electronic gateway that provides easy, online access to your medical records. With Connect2me, you can request a clinic appointment, read your test results, renew a prescription or communicate with your care team.     To sign up for [employer] visit the website at [email or web address]   You will be asked to enter the access code listed below, as well as some personal information. Please follow the directions to create your username and password.     Your access code is: [email or web address]  Expires: 2017  4:36 PM     Your access code will  in 90 days. If you need help or a new code, please contact your [facility] Clinic or call [phone] for assistance.        Care EveryWhere ID     This is your Care  EveryWhere ID. This could be used by other organizations to access your [place] medical records  [id number]         Blood Pressure from Last 3 Encounters:   05/30/17 132/86   04/18/17 (!) 138/92   03/27/17 (!) 160/94    Weight from Last 3 Encounters:   05/30/17 99.3 kg (219 lb)   04/18/17 97.1 kg (214 lb)   03/27/17 98.4 kg (217 lb)              We Performed the Following     [id number]-MIYARKOICH TESTING, PER HR/PSYCHOLOGIST     NEUROPSYCH TESTING BY [facility]        Primary Care Provider Office Phone # Fax #    [facility] [phone]       [address] [facility] [address]        Thank you!     Thank you for choosing [facility] NEUROPSYCHOLOGY  for your care. Our goal is always to provide you with excellent care. Hearing back from our patients is one way we can continue to improve our services. Please take a few minutes to complete the written survey that you may receive in the mail after your visit with us. Thank you!             Your Updated Medication List - Protect others around you: Learn how to safely use, store and throw away your medicines at www.disposemymeds.org.          This list is accurate as of: 5/16/17 11:59 PM.  Always use your most recent med list.                   Brand Name Dispense Instructions for use    cyclobenzaprine 10 MG tablet    FLEXERIL    30 tablet    1/2 to 1 tablet in evenings for muscle spasm       loratadine 10 MG tablet    CLARITIN    30    ONE DAILY       Multi-vitamin Tabs tablet   Generic drug:  multivitamin, therapeutic with minerals      1 TABLET DAILY       nortriptyline 50 MG capsule    PAMELOR    90 capsule    Take 1 capsule (50 mg) by mouth At Bedtime       VERAPAMIL HCL PO      Take by mouth daily       VITAMIN D PO      5000 IU daily

## 2017-05-30 ENCOUNTER — OFFICE VISIT (OUTPATIENT)
Dept: FAMILY MEDICINE | Facility: CLINIC | Age: 52
End: 2017-05-30

## 2017-05-30 VITALS
BODY MASS INDEX: 29.66 KG/M2 | WEIGHT: 219 LBS | SYSTOLIC BLOOD PRESSURE: 132 MMHG | TEMPERATURE: 98.4 F | HEIGHT: 72 IN | DIASTOLIC BLOOD PRESSURE: 86 MMHG | HEART RATE: 94 BPM

## 2017-05-30 DIAGNOSIS — S06.9X0S TBI (TRAUMATIC BRAIN INJURY), WITHOUT LOSS OF CONSCIOUSNESS, SEQUELA: Primary | ICD-10-CM

## 2017-05-30 DIAGNOSIS — F07.81 POST CONCUSSION SYNDROME: ICD-10-CM
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
## 2017-05-30 PROCEDURE — 99214 OFFICE O/P EST MOD 30 MIN: CPT | Performed by: PHYSICIAN ASSISTANT
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
## 2017-05-30 NOTE — NURSING NOTE
Chief Complaint   Patient presents with     Work Comp     Follow up on headaches        Initial /86 (BP Location: Right arm, Cuff Size: Adult Large)  Pulse 94  Temp 98.4  F (36.9  C) (Oral)  Ht 6' (1.829 m)  Wt 219 lb (99.3 kg)  BMI 29.7 kg/m2 Estimated body mass index is 29.7 kg/(m^2) as calculated from the following:    Height as of this encounter: 6' (1.829 m).    Weight as of this encounter: 219 lb (99.3 kg).  Medication Reconciliation: complete     Guerline Bergman CMA (AAMA)
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
## 2017-05-31 NOTE — PROGRESS NOTES
[employer] ADULT INTELLIGENCE SCALE-IV TRAIL MAKING TEST                             Scaled Score     Digit Span  11    Time Errors   Vocabulary  13    A  27            1    Arithmetic  12    B  97            0    Symbol Search  11      Coding    7   CONTROLLED WORD ASSOCIATION TEST       [facility] MEMORY SCALES Score  35        Logical Mem Immediate               10/10  LETTER CANCELLATION TEST   Logical Mem 30 Minute    5/6     Visual Repr Immediate    11   Time   307   Errors           0    Visual Repr 30       7     30 Minute Recognition      4  PSYCHOMOTOR TESTS         Right    Left   PHAM VERBAL LEARNING TEST- REVISED Finger Tapping  59     55     Grooved Pegboard  75   104    Trial 1                             7                                                     Drops: 0          Drops: 0     Trial 2                             8               Trial 3                           10           [place] NAMING TEST                                                                                 25  Recall                       9            Score          58    25  Recognition            10                 MAKE A FIGURE TEST   STROOP COLOR-WORD TEST           Score    41                                T-Score    Word               38         TOMM   Color               35         Color-Word             31          Trial 1  50      [place] CARD SORTING TEST-1 DECK Trial 2    50            # of categories   5         # perseverative   7             BLAIR VISUAL-ORGANIZATION TEST            Raw score   29
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
## 2017-06-07 ENCOUNTER — TELEPHONE (OUTPATIENT)
Dept: FAMILY MEDICINE | Facility: CLINIC | Age: 52
End: 2017-06-07

## 2017-06-07 DIAGNOSIS — F07.81 POST CONCUSSION SYNDROME: Primary | ICD-10-CM

## 2017-06-07 NOTE — TELEPHONE ENCOUNTER
Reason for Call:  Medication or medication refill:    Do you use a [facility]?  Name of the pharmacy and phone number for the current request:  [facility], [address] [phone]    Name of the medication requested: verapamil- 40mg 3 times daily    Other request: the last time patient saw Mathew Islas he had suggested that he get this medication filled through him/ Patients neurologist/ [facility]- was the doctor who prescribed this medication.    Can we leave a detailed message on this number? YES    Phone number patient can be reached at: Work number on file:  [phone] (work)    Best Time: any    Call taken on 6/7/2017 at 12:34 PM by Anita Barney

## 2017-06-08 RX ORDER — VERAPAMIL HYDROCHLORIDE 40 MG/1
TABLET ORAL
Qty: 270 TABLET | Refills: 1 | Status: SHIPPED | OUTPATIENT
Start: 2017-06-08 | End: 2017-10-31

## 2017-06-08 NOTE — TELEPHONE ENCOUNTER
Reviewed. Ok to fill. Please confirm the dose is correct at 40 mg, 3 times daily - appears it was confirmed from the pharmacy.  Isaiah Islas MPAS, PA-C

## 2017-06-08 NOTE — TELEPHONE ENCOUNTER
Telephone call to [facility] pharmacy and confirmed that his previous orders for verapamil were for 40mg three times daily.  Rhonda Napier RN  Triage  [facility]  [phone]

## 2017-06-26 ENCOUNTER — TELEPHONE (OUTPATIENT)
Dept: FAMILY MEDICINE | Facility: CLINIC | Age: 52
End: 2017-06-26

## 2017-06-26 DIAGNOSIS — R51.9 NONINTRACTABLE EPISODIC HEADACHE, UNSPECIFIED HEADACHE TYPE: ICD-10-CM

## 2017-06-26 NOTE — TELEPHONE ENCOUNTER
Reason for call:  Patient reporting a symptom    Symptom or request: Headaches have worsened.  Calling to tegane THADDEUS Islas as requested.    Duration (how long have symptoms been present): 2 weeks    Have you been treated for this before? Yes    Additional comments:  Patient has also made request for medication refill for nortriptyline as well.    Phone Number patient can be reached at:  Home number on file [phone] (home)    Best Time:  Any     Can we leave a detailed message on this number:  YES    Call taken on 6/26/2017 at 5:06 PM by Jose Bernard

## 2017-06-26 NOTE — TELEPHONE ENCOUNTER
Reason for Call:  Medication or medication refill:    Do you use a [facility]?  Name of the pharmacy and phone number for the current request:  [facility], [address], [phone]    Name of the medication requested: nortriptyline    Other request:     Can we leave a detailed message on this number? YES    Phone number patient can be reached at: Home number on file [phone] (home)    Best Time: Any    Call taken on 6/26/2017 at 5:03 PM by Jose Bernard

## 2017-06-27 RX ORDER — NORTRIPTYLINE HYDROCHLORIDE 50 MG/1
50 CAPSULE ORAL AT BEDTIME
Qty: 90 CAPSULE | Refills: 0 | Status: SHIPPED | OUTPATIENT
Start: 2017-06-27 | End: 2017-10-02

## 2017-06-27 NOTE — TELEPHONE ENCOUNTER
Left message on machine requesting call back to gather information regarding th symptoms he is experiencing.  Janine Lawrence RN

## 2017-06-27 NOTE — TELEPHONE ENCOUNTER
"Shon called back to RN voicemail and left a message at 4:32pm. States he doesn't need to talk to a nurse about his headache, just wanted to update Mathew Islas that it is \"a little worse.\" Mathew had asked him to call if that happened. FYI to provider.     Cara Rasmussen RN    "

## 2017-07-25 ENCOUNTER — OFFICE VISIT (OUTPATIENT)
Dept: FAMILY MEDICINE | Facility: CLINIC | Age: 52
End: 2017-07-25

## 2017-07-25 VITALS
HEIGHT: 72 IN | TEMPERATURE: 98.6 F | DIASTOLIC BLOOD PRESSURE: 82 MMHG | BODY MASS INDEX: 29.58 KG/M2 | SYSTOLIC BLOOD PRESSURE: 132 MMHG | WEIGHT: 218.38 LBS | HEART RATE: 88 BPM

## 2017-07-25 DIAGNOSIS — S06.9X0S TRAUMATIC BRAIN INJURY, WITHOUT LOC, SEQUELA: ICD-10-CM

## 2017-07-25 DIAGNOSIS — R51.9 NONINTRACTABLE EPISODIC HEADACHE, UNSPECIFIED HEADACHE TYPE: Primary | ICD-10-CM

## 2017-07-25 DIAGNOSIS — M54.2 NECK PAIN: ICD-10-CM

## 2017-07-25 PROBLEM — S06.9XAA TRAUMATIC BRAIN INJURY (H): Status: ACTIVE | Noted: 2017-02-16

## 2017-07-25 PROCEDURE — 99215 OFFICE O/P EST HI 40 MIN: CPT | Performed by: PHYSICIAN ASSISTANT

## 2017-07-25 RX ORDER — VERAPAMIL HYDROCHLORIDE 180 MG/1
180 TABLET, EXTENDED RELEASE ORAL DAILY
Qty: 90 TABLET | Refills: 0 | Status: SHIPPED | OUTPATIENT
Start: 2017-07-25 | End: 2017-10-31
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
## 2017-09-08 ENCOUNTER — TRANSFERRED RECORDS (OUTPATIENT)
Dept: HEALTH INFORMATION MANAGEMENT | Facility: CLINIC | Age: 52
End: 2017-09-08
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
## 2017-09-11 DIAGNOSIS — M62.838 CERVICAL PARASPINAL MUSCLE SPASM: ICD-10-CM

## 2017-09-11 DIAGNOSIS — M54.2 CERVICALGIA: ICD-10-CM

## 2017-09-11 NOTE — TELEPHONE ENCOUNTER
Medication Detail      Disp Refills Start End ALBIN   cyclobenzaprine (FLEXERIL) 10 MG tablet 30 tablet 5 3/2/2017  No   Si/2 to 1 tablet in evenings for muscle spasm   Class: E-Prescribe   Order: [id number]       Last Office Visit with [facility] or [facility] prescribing provider: 2017  Future Office visit:       Routing refill request to provider for review/approval because:  Drug not on the [facility], [facility] or [facility] refill protocol or controlled substance

## 2017-09-12 RX ORDER — CYCLOBENZAPRINE HCL 10 MG
TABLET ORAL
Qty: 30 TABLET | Refills: 5 | Status: SHIPPED | OUTPATIENT
Start: 2017-09-12 | End: 2018-03-06

## 2017-09-22 ENCOUNTER — TRANSFERRED RECORDS (OUTPATIENT)
Dept: HEALTH INFORMATION MANAGEMENT | Facility: CLINIC | Age: 52
End: 2017-09-22

## 2017-10-02 ENCOUNTER — TELEPHONE (OUTPATIENT)
Dept: FAMILY MEDICINE | Facility: CLINIC | Age: 52
End: 2017-10-02

## 2017-10-02 DIAGNOSIS — R51.9 NONINTRACTABLE EPISODIC HEADACHE, UNSPECIFIED HEADACHE TYPE: ICD-10-CM

## 2017-10-02 NOTE — TELEPHONE ENCOUNTER
nortriptyline  Last Written Prescription Date: 06/27/17  Last Fill Quantity: 90, # refills: 0  Last Office Visit with [facility], [facility] or  Health prescribing provider: 07/25/17 [place]        BP Readings from Last 3 Encounters:   07/25/17 132/82   05/30/17 132/86   04/18/17 (!) 138/92     Last PHQ-9 score on record=   PHQ-9 SCORE 2/2/2017   Total Score 19

## 2017-10-03 NOTE — TELEPHONE ENCOUNTER
Reason for Call:  Other prescription    Detailed comments: Shon called in seeing where his Rx was at and to let Isaiah Islas know his headaches have worsen for about a month now     Phone Number Patient can be reached at: Home number on file [phone] (home)    Best Time:     Can we leave a detailed message on this number? YES    Call taken on 10/3/2017 at 5:00 PM by April Hood

## 2017-10-03 NOTE — TELEPHONE ENCOUNTER
Called and spoke with patient. In March he started verapamil and his headaches were much better for a long time. Now his headaches have been worse for the last month. They have been sharp and shooting. He has concerns about other medications possibly causing his headaches. He's been seeing a neurologist who increased the verapamil which has not helped. He reports that his light sensitivity improved, but now it's worsening again.     Patient wanted to update you regarding all of this. He wonders what the next step should be. Would you like to refill the medication below, or change med? Would you like an office visit or phone visit to further discuss?    Bang Dykes RN

## 2017-10-04 RX ORDER — NORTRIPTYLINE HYDROCHLORIDE 50 MG/1
CAPSULE ORAL
Qty: 90 CAPSULE | Refills: 0 | Status: SHIPPED | OUTPATIENT
Start: 2017-10-04 | End: 2017-12-05

## 2017-10-04 NOTE — TELEPHONE ENCOUNTER
Refills sent. Please call and ask that he follow up. I saw him briefly during his daughter's appointment and it appears he was still having a rough time.  Thanks.  Isaiah Islas

## 2017-10-31 ENCOUNTER — OFFICE VISIT (OUTPATIENT)
Dept: FAMILY MEDICINE | Facility: CLINIC | Age: 52
End: 2017-10-31

## 2017-10-31 VITALS
BODY MASS INDEX: 29.53 KG/M2 | WEIGHT: 218 LBS | SYSTOLIC BLOOD PRESSURE: 136 MMHG | HEART RATE: 88 BPM | HEIGHT: 72 IN | TEMPERATURE: 98.8 F | DIASTOLIC BLOOD PRESSURE: 92 MMHG

## 2017-10-31 DIAGNOSIS — S06.9X0S TRAUMATIC BRAIN INJURY, WITHOUT LOSS OF CONSCIOUSNESS, SEQUELA (H): Primary | ICD-10-CM

## 2017-10-31 DIAGNOSIS — H53.149 SENSITIVENESS TO LIGHT: ICD-10-CM

## 2017-10-31 DIAGNOSIS — F07.81 POST CONCUSSION SYNDROME: ICD-10-CM

## 2017-10-31 DIAGNOSIS — R51.9 CHRONIC NONINTRACTABLE HEADACHE, UNSPECIFIED HEADACHE TYPE: ICD-10-CM

## 2017-10-31 DIAGNOSIS — G89.29 CHRONIC NONINTRACTABLE HEADACHE, UNSPECIFIED HEADACHE TYPE: ICD-10-CM

## 2017-10-31 PROCEDURE — 99214 OFFICE O/P EST MOD 30 MIN: CPT | Performed by: PHYSICIAN ASSISTANT

## 2017-10-31 RX ORDER — VERAPAMIL HYDROCHLORIDE 240 MG/1
240 TABLET, FILM COATED, EXTENDED RELEASE ORAL DAILY
Qty: 90 TABLET | Refills: 1 | COMMUNITY
Start: 2017-10-31 | End: 2018-01-10
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
## 2017-10-31 NOTE — PATIENT INSTRUCTIONS
N: [place] [facility] [phone]   [email or web address]  [place] [phone]   [email or web address]  [place] [phone]   [email or web address]
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
## 2017-10-31 NOTE — NURSING NOTE
Chief Complaint   Patient presents with     Recheck Medication     Headache     recheck        Initial BP (!) 136/92 (BP Location: Right arm, Cuff Size: Adult Regular)  Pulse 88  Temp 98.8  F (37.1  C) (Oral)  Ht 6' (1.829 m)  Wt 218 lb (98.9 kg)  BMI 29.57 kg/m2 Estimated body mass index is 29.57 kg/(m^2) as calculated from the following:    Height as of this encounter: 6' (1.829 m).    Weight as of this encounter: 218 lb (98.9 kg).  Medication Reconciliation: complete     Guerline Bergman CMA (AAMA)

## 2017-11-29 ENCOUNTER — TELEPHONE (OUTPATIENT)
Dept: FAMILY MEDICINE | Facility: CLINIC | Age: 52
End: 2017-11-29

## 2017-11-29 NOTE — TELEPHONE ENCOUNTER
"Called and spoke with wife. She states that patient is \"about to get fired because he can't do his job with his brain issues\". He needs to see PCP ASAP to talk about worker's comp. There are no new problems, but the issues at work are \"escalating\". The catch is that patient can not get off of work to see provider, so his appointment would need to be after 415 pm. Yolanda thinks that a phone appointment would be okay. Will route to PCP. Can you fit patient in during this specified time, or do a phone visit?    Bang Dykes RN    "

## 2017-11-29 NOTE — TELEPHONE ENCOUNTER
Reason for Call:  Other appointment    Detailed comments: Patient would like to be seen for a head injury follow up.    Phone Number Patient can be reached at: Other phone number:    Yolanda Bailey (Spouse) [phone]         Best Time:     Can we leave a detailed message on this number? NO    Call taken on 11/29/2017 at 10:01 AM by Vicky Erazo

## 2017-11-29 NOTE — TELEPHONE ENCOUNTER
If they can do a phone visit at another time - that would be fine. (Thursday / Friday) during the day. Same day or over a 40 minute physical is fine.   If they cannot do a phone visit during the day and it needs to be after 4:15 - next Tuesday would be my only available that late. There's a physical I can double book on.     LANRE Elder, PA-C

## 2017-12-05 ENCOUNTER — OFFICE VISIT (OUTPATIENT)
Dept: FAMILY MEDICINE | Facility: CLINIC | Age: 52
End: 2017-12-05

## 2017-12-05 VITALS
TEMPERATURE: 98.4 F | DIASTOLIC BLOOD PRESSURE: 93 MMHG | BODY MASS INDEX: 29.53 KG/M2 | WEIGHT: 218 LBS | SYSTOLIC BLOOD PRESSURE: 143 MMHG | HEIGHT: 72 IN | HEART RATE: 82 BPM | OXYGEN SATURATION: 97 %

## 2017-12-05 DIAGNOSIS — R51.9 CHRONIC NONINTRACTABLE HEADACHE, UNSPECIFIED HEADACHE TYPE: ICD-10-CM

## 2017-12-05 DIAGNOSIS — S06.9X0S TRAUMATIC BRAIN INJURY, WITHOUT LOSS OF CONSCIOUSNESS, SEQUELA (H): Primary | ICD-10-CM

## 2017-12-05 DIAGNOSIS — M54.2 NECK PAIN: ICD-10-CM

## 2017-12-05 DIAGNOSIS — F07.81 POST CONCUSSION SYNDROME: ICD-10-CM

## 2017-12-05 DIAGNOSIS — F41.0 PANIC ATTACK: ICD-10-CM

## 2017-12-05 DIAGNOSIS — R51.9 NONINTRACTABLE EPISODIC HEADACHE, UNSPECIFIED HEADACHE TYPE: ICD-10-CM

## 2017-12-05 DIAGNOSIS — G89.29 CHRONIC NONINTRACTABLE HEADACHE, UNSPECIFIED HEADACHE TYPE: ICD-10-CM

## 2017-12-05 PROCEDURE — 99215 OFFICE O/P EST HI 40 MIN: CPT | Performed by: PHYSICIAN ASSISTANT

## 2017-12-05 RX ORDER — NORTRIPTYLINE HYDROCHLORIDE 50 MG/1
50 CAPSULE ORAL AT BEDTIME
Qty: 90 CAPSULE | Refills: 1 | Status: SHIPPED | OUTPATIENT
Start: 2017-12-05 | End: 2018-03-06

## 2017-12-05 RX ORDER — ALPRAZOLAM 0.5 MG
TABLET ORAL
Qty: 20 TABLET | Refills: 0 | Status: SHIPPED | OUTPATIENT
Start: 2017-12-05 | End: 2018-03-06

## 2017-12-05 ASSESSMENT — PAIN SCALES - GENERAL: PAINLEVEL: MODERATE PAIN (5)
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
## 2017-12-06 NOTE — PATIENT INSTRUCTIONS
1. [facility] acupuncture - consider being seen  2. [place] [facility] ??   3. Studies being done on TBI?

## 2017-12-06 NOTE — NURSING NOTE
Handed patient 1 printed out prescription for:    ALPRAZolam (XANAX) 0.5 MG tablet    Candy Barnard, CMA
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
## 2017-12-11 ENCOUNTER — TRANSFERRED RECORDS (OUTPATIENT)
Dept: HEALTH INFORMATION MANAGEMENT | Facility: CLINIC | Age: 52
End: 2017-12-11

## 2018-01-08 DIAGNOSIS — F07.81 POST CONCUSSION SYNDROME: ICD-10-CM

## 2018-01-08 DIAGNOSIS — S06.9X0S TRAUMATIC BRAIN INJURY, WITHOUT LOSS OF CONSCIOUSNESS, SEQUELA (H): ICD-10-CM

## 2018-01-08 NOTE — TELEPHONE ENCOUNTER
"Requested Prescriptions   Pending Prescriptions Disp Refills     verapamil (CALAN) 40 MG tablet [Pharmacy Med Name: Verapamil HCl Oral Tablet 40 MG]    Last Written Prescription Date:  10/31/2017  Last Fill Quantity: 90 tabs,  # refills: 1   Last Office Visit with G, P or [facility] prescribing provider:  12/5/2017   Future Office Visit:      270 tablet 0     Sig: TAKE 1 TABLET BY MOUTH 3 TIMES DAILY    Calcium Channel Blockers Protocol  Failed    1/8/2018  7:01 AM       Failed - Blood pressure under 140/90    BP Readings from Last 3 Encounters:   12/05/17 (!) 143/93   10/31/17 (!) 136/92   07/25/17 132/82          Failed - Normal ALT in past 12 months    No lab results found.         Failed - Normal serum creatinine on file in past 12 months    No lab results found.         Passed - Recent or future visit with authorizing provider    Patient had office visit in the last year or has a visit in the next 30 days with authorizing provider.  See \"Patient Info\" tab in inbasket, or \"Choose Columns\" in Meds & Orders section of the refill encounter.          Passed - Patient is age 18 or older          "

## 2018-01-10 RX ORDER — VERAPAMIL HYDROCHLORIDE 240 MG/1
240 TABLET, FILM COATED, EXTENDED RELEASE ORAL DAILY
Qty: 90 TABLET | Refills: 1 | Status: SHIPPED | OUTPATIENT
Start: 2018-01-10 | End: 2018-03-06

## 2018-01-10 RX ORDER — VERAPAMIL HYDROCHLORIDE 40 MG/1
TABLET ORAL
Qty: 270 TABLET | Refills: 0 | OUTPATIENT
Start: 2018-01-10

## 2018-01-10 NOTE — TELEPHONE ENCOUNTER
Routing refill request to provider for review/approval because:    Calcium Channel Blockers Protocol Failed1/8 5:54 PM   Blood pressure under 140/90    Normal ALT in past 12 months    Normal serum creatinine on file in past 12 months     Bang Dykes RN

## 2018-03-06 ENCOUNTER — OFFICE VISIT (OUTPATIENT)
Dept: FAMILY MEDICINE | Facility: CLINIC | Age: 53
End: 2018-03-06
Payer: COMMERCIAL

## 2018-03-06 VITALS
HEIGHT: 72 IN | DIASTOLIC BLOOD PRESSURE: 84 MMHG | HEART RATE: 88 BPM | BODY MASS INDEX: 30.37 KG/M2 | SYSTOLIC BLOOD PRESSURE: 148 MMHG | WEIGHT: 224.25 LBS | TEMPERATURE: 98.7 F

## 2018-03-06 DIAGNOSIS — S06.9X0S TRAUMATIC BRAIN INJURY, WITHOUT LOSS OF CONSCIOUSNESS, SEQUELA (H): Primary | ICD-10-CM

## 2018-03-06 DIAGNOSIS — G89.29 CHRONIC NONINTRACTABLE HEADACHE, UNSPECIFIED HEADACHE TYPE: ICD-10-CM

## 2018-03-06 DIAGNOSIS — M54.2 NECK PAIN: ICD-10-CM
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
## 2018-03-06 DIAGNOSIS — F41.0 PANIC ATTACK: ICD-10-CM

## 2018-03-06 DIAGNOSIS — F41.1 GAD (GENERALIZED ANXIETY DISORDER): ICD-10-CM

## 2018-03-06 DIAGNOSIS — R51.9 NONINTRACTABLE EPISODIC HEADACHE, UNSPECIFIED HEADACHE TYPE: ICD-10-CM

## 2018-03-06 DIAGNOSIS — M54.2 CERVICALGIA: ICD-10-CM

## 2018-03-06 DIAGNOSIS — F33.1 MODERATE EPISODE OF RECURRENT MAJOR DEPRESSIVE DISORDER (H): ICD-10-CM

## 2018-03-06 DIAGNOSIS — R51.9 CHRONIC NONINTRACTABLE HEADACHE, UNSPECIFIED HEADACHE TYPE: ICD-10-CM

## 2018-03-06 DIAGNOSIS — F07.81 POST CONCUSSION SYNDROME: ICD-10-CM

## 2018-03-06 DIAGNOSIS — M62.838 CERVICAL PARASPINAL MUSCLE SPASM: ICD-10-CM

## 2018-03-06 PROCEDURE — 99214 OFFICE O/P EST MOD 30 MIN: CPT | Performed by: PHYSICIAN ASSISTANT

## 2018-03-06 RX ORDER — CYCLOBENZAPRINE HCL 10 MG
TABLET ORAL
Qty: 30 TABLET | Refills: 5 | Status: SHIPPED | OUTPATIENT
Start: 2018-03-06 | End: 2018-03-06

## 2018-03-06 RX ORDER — NORTRIPTYLINE HYDROCHLORIDE 50 MG/1
50 CAPSULE ORAL AT BEDTIME
Qty: 90 CAPSULE | Refills: 1 | Status: SHIPPED | OUTPATIENT
Start: 2018-03-06 | End: 2019-01-07

## 2018-03-06 RX ORDER — ALPRAZOLAM 0.5 MG
TABLET ORAL
Qty: 20 TABLET | Refills: 0 | Status: SHIPPED | OUTPATIENT
Start: 2018-03-06 | End: 2018-08-09

## 2018-03-06 RX ORDER — VERAPAMIL HYDROCHLORIDE 240 MG/1
240 TABLET, FILM COATED, EXTENDED RELEASE ORAL DAILY
Qty: 90 TABLET | Refills: 1 | Status: SHIPPED | OUTPATIENT
Start: 2018-03-06 | End: 2019-01-15

## 2018-03-06 RX ORDER — CYCLOBENZAPRINE HCL 10 MG
TABLET ORAL
Qty: 90 TABLET | Refills: 5 | Status: SHIPPED | OUTPATIENT
Start: 2018-03-06 | End: 2018-09-23

## 2018-03-06 ASSESSMENT — ANXIETY QUESTIONNAIRES
5. BEING SO RESTLESS THAT IT IS HARD TO SIT STILL: MORE THAN HALF THE DAYS
2. NOT BEING ABLE TO STOP OR CONTROL WORRYING: NEARLY EVERY DAY
3. WORRYING TOO MUCH ABOUT DIFFERENT THINGS: NEARLY EVERY DAY
1. FEELING NERVOUS, ANXIOUS, OR ON EDGE: NEARLY EVERY DAY
GAD7 TOTAL SCORE: 19
6. BECOMING EASILY ANNOYED OR IRRITABLE: NEARLY EVERY DAY
7. FEELING AFRAID AS IF SOMETHING AWFUL MIGHT HAPPEN: NEARLY EVERY DAY

## 2018-03-06 ASSESSMENT — PAIN SCALES - GENERAL: PAINLEVEL: MODERATE PAIN (5)

## 2018-03-06 ASSESSMENT — PATIENT HEALTH QUESTIONNAIRE - PHQ9: 5. POOR APPETITE OR OVEREATING: MORE THAN HALF THE DAYS

## 2018-03-06 NOTE — PATIENT INSTRUCTIONS
"1. I will refer you therapy - our schedulers will call you  2. I really want you to consider medication for this. Sertraline (Zoloft) is a good option. Not long term. Reassure your family - goal will be to get you off. Your family will say \"No!\" but this is your decision and I can get you off of it at some point. What we've been doing thus far hasn't worked  3. Consider Botox - I will put in a referral for our pain for injection only visit  - they will call you to schedule   "
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
## 2018-03-07 ASSESSMENT — PATIENT HEALTH QUESTIONNAIRE - PHQ9: SUM OF ALL RESPONSES TO PHQ QUESTIONS 1-9: 19

## 2018-03-07 ASSESSMENT — ANXIETY QUESTIONNAIRES: GAD7 TOTAL SCORE: 19

## 2018-03-27 ENCOUNTER — VIRTUAL VISIT (OUTPATIENT)
Dept: FAMILY MEDICINE | Facility: CLINIC | Age: 53
End: 2018-03-27
Payer: COMMERCIAL

## 2018-03-27 DIAGNOSIS — H53.149 SENSITIVENESS TO LIGHT: ICD-10-CM

## 2018-03-27 DIAGNOSIS — M54.2 NECK PAIN: ICD-10-CM

## 2018-03-27 DIAGNOSIS — S06.9X0S TRAUMATIC BRAIN INJURY, WITHOUT LOSS OF CONSCIOUSNESS, SEQUELA (H): Primary | ICD-10-CM

## 2018-03-27 DIAGNOSIS — F07.81 POST CONCUSSION SYNDROME: ICD-10-CM

## 2018-03-27 PROCEDURE — 98966 PH1 ASSMT&MGMT NQHP 5-10: CPT | Performed by: PHYSICIAN ASSISTANT
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
## 2018-03-27 NOTE — PROGRESS NOTES
"Shon Bailey is a 52 year old male who is being evaluated via a telephone visit.      The patient has been notified of following:     \"This telephone visit will be conducted via a call between you and your physician/provider. We have found that certain health care needs can be provided without the need for a physical exam.  This service lets us provide the care you need with a short phone conversation.  If a prescription is necessary we can send it directly to your pharmacy.  If lab work is needed we can place an order for that and you can then stop by our lab to have the test done at a later time.    We will bill your insurance company for this service.  Please check with your medical insurance if this type of visit is covered. You may be responsible for the cost of this type of visit if insurance coverage is denied.  The typical cost is $30 (10min), $59 (11-20min) and $85 (21-30min).  Most often these visits are shorter than 10 minutes.    If during the course of the call the physician/provider feels a telephone visit is not appropriate, you will not be charged for this service.\"       Consent has been obtained for this service by care team member: yes.   See the scanned image in the medical record.    Shon Bailey complains of  Headache (No change in headaches - same frequency and and intensity )      I have reviewed and updated the patient's Past Medical History, Social History, Family History and Medication List.    ALLERGIES  No known drug allergies    Guerline Bergman CMA ([facility])   (MA signature)    Additional provider notes:   Things about the same. Will be seeing neurology for 2nd opinion in a week or two. Is planning on seeing pain management next week. He hasn't made a therapist appointment yet. Denies new symptoms.     Assessment/Plan:    ICD-10-CM    1. Traumatic brain injury, without loss of consciousness, sequela (H) S06.9X0S    2. Neck pain M54.2    3. Post concussion syndrome F07.81    4. Sensitiveness " to light H53.149         I have reviewed the note as documented above.  This accurately captures the substance of my conversation with the patient. I congratulated him on following through with a few of the recommendations I've made. He agrees to see pain and will try to get into a therapist. Follow up as needed.     Total time of call between patient and provider was 10 minutes     LANRE Elder, PA-C

## 2018-04-04 ENCOUNTER — OFFICE VISIT (OUTPATIENT)
Dept: ANESTHESIOLOGY | Facility: CLINIC | Age: 53
End: 2018-04-04
Payer: OTHER MISCELLANEOUS

## 2018-04-04 VITALS — HEIGHT: 72 IN | BODY MASS INDEX: 29.8 KG/M2 | WEIGHT: 220 LBS

## 2018-04-04 DIAGNOSIS — G43.719 INTRACTABLE CHRONIC MIGRAINE WITHOUT AURA AND WITHOUT STATUS MIGRAINOSUS: Primary | ICD-10-CM

## 2018-04-04 ASSESSMENT — ENCOUNTER SYMPTOMS
LOSS OF CONSCIOUSNESS: 0
PARALYSIS: 0
NECK MASS: 0
PANIC: 1
WHEEZING: 0
EXERCISE INTOLERANCE: 1
COUGH DISTURBING SLEEP: 0
STIFFNESS: 0
SLEEP DISTURBANCES DUE TO BREATHING: 0
NUMBNESS: 1
SINUS CONGESTION: 0
TROUBLE SWALLOWING: 0
PALPITATIONS: 0
DOUBLE VISION: 0
WEAKNESS: 1
INCREASED ENERGY: 1
ORTHOPNEA: 0
DYSURIA: 0
SORE THROAT: 0
MEMORY LOSS: 1
HEMATURIA: 0
DECREASED CONCENTRATION: 1
SYNCOPE: 0
SHORTNESS OF BREATH: 0
NECK PAIN: 1
LIGHT-HEADEDNESS: 1
NIGHT SWEATS: 1
FLANK PAIN: 0
SPEECH CHANGE: 0
EYE REDNESS: 0
EYE IRRITATION: 1
COUGH: 0
NERVOUS/ANXIOUS: 1
NAIL CHANGES: 0
POLYDIPSIA: 1
SMELL DISTURBANCE: 0
HOARSE VOICE: 0
EYE WATERING: 0
SINUS PAIN: 0
EYE PAIN: 1
INSOMNIA: 0
SKIN CHANGES: 0
POSTURAL DYSPNEA: 0
SPUTUM PRODUCTION: 0
HEADACHES: 1
MYALGIAS: 1
TASTE DISTURBANCE: 0
DIZZINESS: 1
SEIZURES: 0
LEG PAIN: 0
JOINT SWELLING: 0
DISTURBANCES IN COORDINATION: 1
SNORES LOUDLY: 1
HEMOPTYSIS: 0
TINGLING: 1
HALLUCINATIONS: 0
DIFFICULTY URINATING: 0
HYPOTENSION: 0
MUSCLE CRAMPS: 0
POOR WOUND HEALING: 0
ARTHRALGIAS: 1
BACK PAIN: 1
DYSPNEA ON EXERTION: 1
TREMORS: 1
ALTERED TEMPERATURE REGULATION: 0
HYPERTENSION: 1
DEPRESSION: 1
FATIGUE: 1
MUSCLE WEAKNESS: 1

## 2018-04-04 ASSESSMENT — ANXIETY QUESTIONNAIRES
5. BEING SO RESTLESS THAT IT IS HARD TO SIT STILL: NEARLY EVERY DAY
2. NOT BEING ABLE TO STOP OR CONTROL WORRYING: NEARLY EVERY DAY
GAD7 TOTAL SCORE: 21
4. TROUBLE RELAXING: NEARLY EVERY DAY
GAD7 TOTAL SCORE: 21
1. FEELING NERVOUS, ANXIOUS, OR ON EDGE: NEARLY EVERY DAY
7. FEELING AFRAID AS IF SOMETHING AWFUL MIGHT HAPPEN: NEARLY EVERY DAY
7. FEELING AFRAID AS IF SOMETHING AWFUL MIGHT HAPPEN: NEARLY EVERY DAY
3. WORRYING TOO MUCH ABOUT DIFFERENT THINGS: NEARLY EVERY DAY
6. BECOMING EASILY ANNOYED OR IRRITABLE: NEARLY EVERY DAY
GAD7 TOTAL SCORE: 21

## 2018-04-04 ASSESSMENT — PATIENT HEALTH QUESTIONNAIRE - PHQ9
10. IF YOU CHECKED OFF ANY PROBLEMS, HOW DIFFICULT HAVE THESE PROBLEMS MADE IT FOR YOU TO DO YOUR WORK, TAKE CARE OF THINGS AT HOME, OR GET ALONG WITH OTHER PEOPLE: EXTREMELY DIFFICULT
SUM OF ALL RESPONSES TO PHQ QUESTIONS 1-9: 18
SUM OF ALL RESPONSES TO PHQ QUESTIONS 1-9: 18

## 2018-04-04 ASSESSMENT — PAIN SCALES - GENERAL: PAINLEVEL: MILD PAIN (3)

## 2018-04-04 NOTE — NURSING NOTE
AVS given and reviewed with pt.  Pt verbalized understanding and declined any questions.     Millie Soto, RN, BSN

## 2018-04-04 NOTE — PATIENT INSTRUCTIONS
1. Prior authorization request for Botox submitted to our team today.     Follow up: Schedule appointment for botox injections       To speak with a nurse, schedule/reschedule/cancel a clinic appointment, or request a medication refill call: [phone]     You can also reach us by Cloud Your Car: [email or web address]    For refills, please call on Monday, 1 week before your medication runs out. The doctors are not always in clinic, so this gives us time to get your prescriptions ready.  Please let us know the name of the medication you are requesting a refill of.
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
## 2018-04-05 ASSESSMENT — PATIENT HEALTH QUESTIONNAIRE - PHQ9: SUM OF ALL RESPONSES TO PHQ QUESTIONS 1-9: 18

## 2018-04-05 ASSESSMENT — ANXIETY QUESTIONNAIRES: GAD7 TOTAL SCORE: 21

## 2018-06-14 PROBLEM — F32.0 MILD MAJOR DEPRESSION (H): Status: ACTIVE | Noted: 2018-06-14

## 2018-08-18 ENCOUNTER — TELEPHONE (OUTPATIENT)
Dept: FAMILY MEDICINE | Facility: CLINIC | Age: 53
End: 2018-08-18

## 2018-08-18 DIAGNOSIS — F41.0 PANIC ATTACK: ICD-10-CM

## 2018-08-20 DIAGNOSIS — F41.0 PANIC ATTACK: ICD-10-CM

## 2018-08-20 RX ORDER — ALPRAZOLAM 0.5 MG
TABLET ORAL
Qty: 20 TABLET | Refills: 0 | OUTPATIENT
Start: 2018-08-20

## 2018-08-20 NOTE — TELEPHONE ENCOUNTER
Patient was given 20 tablets on 8/10/18. Has he used all of these?    Patient was instructed to return call to [facility] RN directly on the RN Call back line at [phone].     Bang Dykes RN

## 2018-08-20 NOTE — TELEPHONE ENCOUNTER
Requested Prescriptions   Pending Prescriptions Disp Refills     ALPRAZolam (XANAX) 0.5 MG tablet [Pharmacy Med Name: ALPRAZolam Oral Tablet 0.5 MG] 20 tablet 0     Sig: TAKE ONE-HALF TO ONE TABLET BY MOUTH AS NEEDED FOR PANIC ATTACKS    There is no refill protocol information for this order

## 2018-08-20 NOTE — TELEPHONE ENCOUNTER
TC- please refax to [facility]. This was faxed to [facility] on 8/10- they state they haven't received it and his last fill was March.  Penny Barnard RN

## 2018-08-20 NOTE — TELEPHONE ENCOUNTER
ALPRAZolam (XANAX) 0.5 MG tablet      Last Written Prescription Date:  8/10/2018  Last Fill Quantity: 20 tablet,   # refills: 0  Last Office Visit: 3/6/2018  THADDEUS Austin    Future Office visit:       Routing refill request to provider for review/approval because:  Drug not on the [facility], [facility] or [facility] refill protocol or controlled substance

## 2018-08-21 NOTE — TELEPHONE ENCOUNTER
Patient/family was instructed to return call to [facility] RN directly on the RN Call back line at [phone].     Bang Dykes RN

## 2018-08-22 RX ORDER — ALPRAZOLAM 0.5 MG
TABLET ORAL
Qty: 20 TABLET | Refills: 0 | OUTPATIENT
Start: 2018-08-22

## 2018-08-22 NOTE — TELEPHONE ENCOUNTER
Spoke with patient to inquire about the Xanax, and he states he never picked up the last script sent on 8/10/18, that when he asked [facility], they had said they didn't receive it.  Per our records, was faxed to them on 8/10/18.  RN spoke with [facility], and they stated they did not receive, so RN called it in to the pharmacist.  Patient notified and will pick it up today.    Josie Griffith RN

## 2018-09-17 ENCOUNTER — TELEPHONE (OUTPATIENT)
Dept: FAMILY MEDICINE | Facility: CLINIC | Age: 53
End: 2018-09-17

## 2018-09-17 NOTE — TELEPHONE ENCOUNTER
Patient due for depression follow up visit. Please help schedule before 11/6/18.    Bang Dykes RN

## 2018-09-17 NOTE — TELEPHONE ENCOUNTER
Please repeat PHQ-9.  Index date: 3/6/18  Follow up start date:  7/6/18  Follow up end date:  11/6/18    Nasreen Vazquez MA

## 2018-09-18 NOTE — TELEPHONE ENCOUNTER
Called patient, he said he will call back to schedule. Will keep open to ensure scheduling. If nothing scheduled will need to do PHQ-9 over the phone.    Bang Dykes RN

## 2018-09-23 DIAGNOSIS — M62.838 CERVICAL PARASPINAL MUSCLE SPASM: ICD-10-CM
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
## 2018-09-23 DIAGNOSIS — M54.2 CERVICALGIA: ICD-10-CM

## 2018-09-24 RX ORDER — CYCLOBENZAPRINE HCL 10 MG
TABLET ORAL
Qty: 90 TABLET | Refills: 4 | Status: SHIPPED | OUTPATIENT
Start: 2018-09-24 | End: 2019-03-04

## 2018-09-24 NOTE — TELEPHONE ENCOUNTER
cyclobenzaprine (FLEXERIL) 10 MG tablet      Last Written Prescription Date:  3/6/2018  Last Fill Quantity: 90 tablet,   # refills: 5  Last Office Visit: 3/27/2018  THADDEUS Austin    Future Office visit:       Routing refill request to provider for review/approval because:  Drug not on the [facility], [facility] or [facility] refill protocol or controlled substance

## 2018-11-28 ENCOUNTER — TELEPHONE (OUTPATIENT)
Dept: FAMILY MEDICINE | Facility: CLINIC | Age: 53
End: 2018-11-28

## 2018-11-28 NOTE — TELEPHONE ENCOUNTER
Spoke to patient he states he is willing to do phone follow up if needed for RX renewal.  Leeann Santizo,Clinic Rn  [place]

## 2018-11-28 NOTE — TELEPHONE ENCOUNTER
Patient is calling to leave a message for Mathew Islas to update him on his recent brain injury treatment.    Patient saw Dr. Navarro, Neuropsychologist who thought he may have a vision issue and referred patient to a psychologist Dr. Cabrera who recognized the symptoms of convergence insufficiency and referred patient to Dr. Rodriguez who is an ophthalmologist that confirmed his convergence insufficiency and ordered occupational therapy to help with the headaches. Patient is also seeing a psychiatrist, Dr. Mathew who prescriped 500mg of depacot every night to help with anxiety and recommended that patient start taking xanax more regularly.    Patient states overall his headaches have been better, but he isn't sure if this is because of the depacot of because he was fired in August and hasn't been spending 9hrs a day stressed out and looking at a computer.    Thanks!  Bang Crespo

## 2018-11-28 NOTE — TELEPHONE ENCOUNTER
Reason for Call:  Medication or medication refill:    Do you use a [facility]?  Name of the pharmacy and phone number for the current request:  [facility], [address], [phone]    Name of the medication requested: ALPRAZolam (XANAX) 0.5 MG tablet    Other request: none    Can we leave a detailed message on this number? YES    Phone number patient can be reached at: Home number on file [phone] (home)    Best Time: anytime    Call taken on 11/28/2018 at 4:27 PM by Bang Crespo

## 2018-11-29 NOTE — TELEPHONE ENCOUNTER
Patient/family was instructed to return call to [facility] RN directly on the RN Call back line at [phone].     Bang Dykes RN

## 2018-11-29 NOTE — TELEPHONE ENCOUNTER
We could do that, however the previous message said he was seeing a psychiatrist who recommended the medication more regularly.    I don't often do a regular script other than for panic attacks, I leave the higher frequency recommendations to psychiatry.    If he isn't seeing that psychiatrist regularly and needs to come back to me, we can do the phone visit but if he is seeing the psychiatrist - I'd prefer to defer that prescription to them based on their recommendation.    Thanks.  Mathew

## 2018-11-29 NOTE — TELEPHONE ENCOUNTER
Patient states that he does see psychiatry. He will talk to his psychiatrist about a refill of his medication.  Heath Guevara RN

## 2019-01-07 ENCOUNTER — TELEPHONE (OUTPATIENT)
Dept: FAMILY MEDICINE | Facility: CLINIC | Age: 54
End: 2019-01-07

## 2019-01-07 DIAGNOSIS — F41.0 PANIC ATTACK: ICD-10-CM

## 2019-01-07 DIAGNOSIS — F07.81 POST CONCUSSION SYNDROME: ICD-10-CM

## 2019-01-07 DIAGNOSIS — R51.9 NONINTRACTABLE EPISODIC HEADACHE, UNSPECIFIED HEADACHE TYPE: ICD-10-CM

## 2019-01-07 DIAGNOSIS — M54.2 NECK PAIN: ICD-10-CM

## 2019-01-07 NOTE — TELEPHONE ENCOUNTER
"Requested Prescriptions   Pending Prescriptions Disp Refills     nortriptyline (PAMELOR) 50 MG capsule [Pharmacy Med Name: Nortriptyline HCl Oral Capsule 50 MG]  Last Written Prescription Date:  3/6/2018  Last Fill Quantity: 90 caps,  # refills: 1   Last office visit: 3/27/2018 with prescribing provider:  Roshan   Future Office Visit:     30 capsule 0     Sig: Take 1 capsule (50 mg) by mouth At Bedtime    Tricyclic Agents ( Annual appt and no PHQ9) Failed - 1/7/2019  4:11 PM       Failed - Blood Pressure under 140/90 in past 12 mos    BP Readings from Last 3 Encounters:   03/06/18 148/84   12/05/17 (!) 143/93   10/31/17 (!) 136/92                Passed - Recent (12 mo) or future (30 days) visit within authorizing provider's specialty    Patient had office visit in the last 12 months or has a visit in the next 30 days with authorizing provider or within the authorizing provider's specialty.  See \"Patient Info\" tab in inbasket, or \"Choose Columns\" in Meds & Orders section of the refill encounter.             Passed - Medication is active on med list       Passed - Patient is age 18 or older          "

## 2019-01-08 RX ORDER — NORTRIPTYLINE HYDROCHLORIDE 50 MG/1
50 CAPSULE ORAL AT BEDTIME
Qty: 30 CAPSULE | Refills: 0 | Status: SHIPPED | OUTPATIENT
Start: 2019-01-08 | End: 2019-01-15

## 2019-01-15 ENCOUNTER — OFFICE VISIT (OUTPATIENT)
Dept: FAMILY MEDICINE | Facility: CLINIC | Age: 54
End: 2019-01-15
Payer: COMMERCIAL

## 2019-01-15 VITALS
HEIGHT: 72 IN | TEMPERATURE: 98 F | WEIGHT: 230.2 LBS | DIASTOLIC BLOOD PRESSURE: 90 MMHG | HEART RATE: 76 BPM | BODY MASS INDEX: 31.18 KG/M2 | SYSTOLIC BLOOD PRESSURE: 146 MMHG

## 2019-01-15 DIAGNOSIS — M54.2 NECK PAIN: ICD-10-CM

## 2019-01-15 DIAGNOSIS — R51.9 NONINTRACTABLE EPISODIC HEADACHE, UNSPECIFIED HEADACHE TYPE: ICD-10-CM

## 2019-01-15 DIAGNOSIS — Z00.00 ROUTINE GENERAL MEDICAL EXAMINATION AT A HEALTH CARE FACILITY: Primary | ICD-10-CM

## 2019-01-15 DIAGNOSIS — S06.9X0S TRAUMATIC BRAIN INJURY, WITHOUT LOSS OF CONSCIOUSNESS, SEQUELA (H): ICD-10-CM

## 2019-01-15 DIAGNOSIS — F41.0 PANIC ATTACK: ICD-10-CM

## 2019-01-15 DIAGNOSIS — H51.9 CONVERGENCE INSUFFICIENCY OR PALSY IN BINOCULAR EYE MOVEMENT: ICD-10-CM

## 2019-01-15 DIAGNOSIS — F07.81 POST CONCUSSION SYNDROME: ICD-10-CM

## 2019-01-15 DIAGNOSIS — Z12.11 SCREEN FOR COLON CANCER: ICD-10-CM

## 2019-01-15 DIAGNOSIS — Z12.5 SCREENING FOR PROSTATE CANCER: ICD-10-CM

## 2019-01-15 DIAGNOSIS — Z11.59 NEED FOR HEPATITIS C SCREENING TEST: ICD-10-CM

## 2019-01-15 PROCEDURE — G0103 PSA SCREENING: HCPCS | Performed by: PHYSICIAN ASSISTANT

## 2019-01-15 PROCEDURE — 36415 COLL VENOUS BLD VENIPUNCTURE: CPT | Performed by: PHYSICIAN ASSISTANT

## 2019-01-15 PROCEDURE — 80048 BASIC METABOLIC PNL TOTAL CA: CPT | Performed by: PHYSICIAN ASSISTANT

## 2019-01-15 PROCEDURE — 80061 LIPID PANEL: CPT | Performed by: PHYSICIAN ASSISTANT

## 2019-01-15 PROCEDURE — 99213 OFFICE O/P EST LOW 20 MIN: CPT | Mod: 25 | Performed by: PHYSICIAN ASSISTANT

## 2019-01-15 PROCEDURE — 87522 HEPATITIS C REVRS TRNSCRPJ: CPT | Performed by: PHYSICIAN ASSISTANT

## 2019-01-15 PROCEDURE — 86803 HEPATITIS C AB TEST: CPT | Performed by: PHYSICIAN ASSISTANT

## 2019-01-15 PROCEDURE — 99396 PREV VISIT EST AGE 40-64: CPT | Performed by: PHYSICIAN ASSISTANT

## 2019-01-15 RX ORDER — DIVALPROEX SODIUM 500 MG/1
1000 TABLET, EXTENDED RELEASE ORAL DAILY
COMMUNITY
End: 2023-01-11

## 2019-01-15 RX ORDER — VERAPAMIL HYDROCHLORIDE 240 MG/1
240 TABLET, FILM COATED, EXTENDED RELEASE ORAL DAILY
Qty: 90 TABLET | Refills: 3 | Status: SHIPPED | OUTPATIENT
Start: 2019-01-15 | End: 2020-02-04

## 2019-01-15 RX ORDER — NORTRIPTYLINE HYDROCHLORIDE 50 MG/1
50 CAPSULE ORAL AT BEDTIME
Qty: 90 CAPSULE | Refills: 3 | Status: SHIPPED | OUTPATIENT
Start: 2019-01-15 | End: 2020-02-05

## 2019-01-15 ASSESSMENT — ENCOUNTER SYMPTOMS
ABDOMINAL PAIN: 0
SHORTNESS OF BREATH: 0
NAUSEA: 0
JOINT SWELLING: 0
HEARTBURN: 0
SORE THROAT: 0
PALPITATIONS: 0
NERVOUS/ANXIOUS: 1
ARTHRALGIAS: 0
DIARRHEA: 0
HEMATOCHEZIA: 0
CONSTIPATION: 0
WEAKNESS: 0
PARESTHESIAS: 0
HEMATURIA: 0
CHILLS: 0
DIZZINESS: 1
FEVER: 0
DYSURIA: 0
FREQUENCY: 0
COUGH: 0
MYALGIAS: 0
EYE PAIN: 0

## 2019-01-15 ASSESSMENT — ANXIETY QUESTIONNAIRES
3. WORRYING TOO MUCH ABOUT DIFFERENT THINGS: NOT AT ALL
6. BECOMING EASILY ANNOYED OR IRRITABLE: SEVERAL DAYS
5. BEING SO RESTLESS THAT IT IS HARD TO SIT STILL: NOT AT ALL
1. FEELING NERVOUS, ANXIOUS, OR ON EDGE: NOT AT ALL
7. FEELING AFRAID AS IF SOMETHING AWFUL MIGHT HAPPEN: NOT AT ALL
GAD7 TOTAL SCORE: 1
2. NOT BEING ABLE TO STOP OR CONTROL WORRYING: NOT AT ALL
IF YOU CHECKED OFF ANY PROBLEMS ON THIS QUESTIONNAIRE, HOW DIFFICULT HAVE THESE PROBLEMS MADE IT FOR YOU TO DO YOUR WORK, TAKE CARE OF THINGS AT HOME, OR GET ALONG WITH OTHER PEOPLE: SOMEWHAT DIFFICULT

## 2019-01-15 ASSESSMENT — PATIENT HEALTH QUESTIONNAIRE - PHQ9
SUM OF ALL RESPONSES TO PHQ QUESTIONS 1-9: 4
5. POOR APPETITE OR OVEREATING: NOT AT ALL

## 2019-01-15 ASSESSMENT — MIFFLIN-ST. JEOR: SCORE: 1927.18

## 2019-01-15 NOTE — PATIENT INSTRUCTIONS
1. [facility]    Preventive Health Recommendations  Male Ages 50 - 64    Yearly exam:             See your health care provider every year in order to  o   Review health changes.   o   Discuss preventive care.    o   Review your medicines if your doctor has prescribed any.     Have a cholesterol test every 5 years, or more frequently if you are at risk for high cholesterol/heart disease.     Have a diabetes test (fasting glucose) every three years. If you are at risk for diabetes, you should have this test more often.     Have a colonoscopy at age 50, or have a yearly FIT test (stool test). These exams will check for colon cancer.      Talk with your health care provider about whether or not a prostate cancer screening test (PSA) is right for you.    You should be tested each year for STDs (sexually transmitted diseases), if you re at risk.     Shots: Get a flu shot each year. Get a tetanus shot every 10 years.     Nutrition:    Eat at least 5 servings of fruits and vegetables daily.     Eat whole-grain bread, whole-wheat pasta and brown rice instead of white grains and rice.     Get adequate Calcium and Vitamin D.     Lifestyle    Exercise for at least 150 minutes a week (30 minutes a day, 5 days a week). This will help you control your weight and prevent disease.     Limit alcohol to one drink per day.     No smoking.     Wear sunscreen to prevent skin cancer.     See your dentist every six months for an exam and cleaning.     See your eye doctor every 1 to 2 years.

## 2019-01-15 NOTE — LETTER
"January 24, 2019      Shon Bailey  [address] [address]        Shon,     It was good seeing you. Keep up all the hard work.     1. Your Hepatitis C screen came back positive. Luckily - the actual Hepatitis C level / lab was completely normal/negative. This DOES NOT mean you have had hepatitis C. The screen test can be falsely positive. We don't need any follow up testing. Again, you DO NOT have hepatitis C. The screening test isn't perfect which is why we do a follow up lab.     2. Kidney, liver, etc looks good     3. Your cholesterol is a bit elevated. Ways to improve your cholesterol...    1- Eats less saturated fats (including avoiding \"trans\" fats).    2 - Eat more unsaturated fats - found in vegetables, grains, and tree nuts. Also by  replacing butter with canola oil or olive oil.    3 - Eat more nuts. 1-2 ounces (a small handful) of almonds, walnuts, hazelnuts or  pecans once a day in place of other less healthy snacks.    4 - Eat more high fiber foods - vegetables and whole grains including oat bran, oats,  beans, peas, and flax seed.    5 - Eat more fish - such as salmon, tuna, mackerel, and sardines. 1 or 2 six ounce  servings per week is a healthy replacement for other proteins.    6 - Exercise for at least 120 minutes per week - which is equal to 30 minutes 4 days per  week.     4. Prostate labs were healthy       Please let me know if you have questions.       Thanks.       Isaiah Islas, RODOLFOS, PA-C         Results for orders placed or performed in visit on 01/15/19   Hepatitis C Screen Reflex to HCV RNA Quant and Genotype   Result Value Ref Range    Hepatitis C Antibody Reactive (AA) NR^Nonreactive   Lipid panel reflex to direct LDL Fasting   Result Value Ref Range    Cholesterol 216 (H) <200 mg/dL    Triglycerides 330 (H) <150 mg/dL    HDL Cholesterol 34 (L) >39 mg/dL    LDL Cholesterol Calculated 116 (H) <100 mg/dL    Non HDL Cholesterol 182 (H) <130 mg/dL   Basic " metabolic panel  (Ca, Cl, CO2, Creat, Gluc, K, Na, BUN)   Result Value Ref Range    Sodium 139 133 - 144 mmol/L    Potassium 3.6 3.4 - 5.3 mmol/L    Chloride 105 94 - 109 mmol/L    Carbon Dioxide 26 20 - 32 mmol/L    Anion Gap 8 3 - 14 mmol/L    Glucose 105 (H) 70 - 99 mg/dL    Urea Nitrogen 14 7 - 30 mg/dL    Creatinine 0.95 0.66 - 1.25 mg/dL    GFR Estimate >90 >60 mL/min/[1.73_m2]    GFR Estimate If Black >90 >60 mL/min/[1.73_m2]    Calcium 9.4 8.5 - 10.1 mg/dL   PSA, screen   Result Value Ref Range    PSA 0.57 0 - 4 ug/L   Hepatitis C RNA Quantitative   Result Value Ref Range    HCV RNA Quant IU/ml HCV RNA Not Detected HCVND^HCV RNA Not Detected [IU]/mL    Log of HCV RNA Qt Not Calculated <1.2 Log IU/mL
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
## 2019-01-16 LAB
ANION GAP SERPL CALCULATED.3IONS-SCNC: 8 MMOL/L (ref 3–14)
BUN SERPL-MCNC: 14 MG/DL (ref 7–30)
CALCIUM SERPL-MCNC: 9.4 MG/DL (ref 8.5–10.1)
CHLORIDE SERPL-SCNC: 105 MMOL/L (ref 94–109)
CHOLEST SERPL-MCNC: 216 MG/DL
CO2 SERPL-SCNC: 26 MMOL/L (ref 20–32)
CREAT SERPL-MCNC: 0.95 MG/DL (ref 0.66–1.25)
GFR SERPL CREATININE-BSD FRML MDRD: >90 ML/MIN/{1.73_M2}
GLUCOSE SERPL-MCNC: 105 MG/DL (ref 70–99)
HDLC SERPL-MCNC: 34 MG/DL
LDLC SERPL CALC-MCNC: 116 MG/DL
NONHDLC SERPL-MCNC: 182 MG/DL
POTASSIUM SERPL-SCNC: 3.6 MMOL/L (ref 3.4–5.3)
PSA SERPL-ACNC: 0.57 UG/L (ref 0–4)
SODIUM SERPL-SCNC: 139 MMOL/L (ref 133–144)
TRIGL SERPL-MCNC: 330 MG/DL

## 2019-01-16 ASSESSMENT — ANXIETY QUESTIONNAIRES: GAD7 TOTAL SCORE: 1

## 2019-01-18 LAB
HCV AB SERPL QL IA: REACTIVE
HCV RNA SERPL NAA+PROBE-ACNC: NORMAL [IU]/ML
HCV RNA SERPL NAA+PROBE-LOG IU: NORMAL LOG IU/ML
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
## 2019-03-04 DIAGNOSIS — M62.838 CERVICAL PARASPINAL MUSCLE SPASM: ICD-10-CM

## 2019-03-04 DIAGNOSIS — M54.2 CERVICALGIA: ICD-10-CM

## 2019-03-04 NOTE — TELEPHONE ENCOUNTER
cyclobenzaprine (FLEXERIL) 10 MG tablet 90 tablet 4 9/24/2018  No   Sig: TAKE ONE TABLET BY MOUTH THREE TIMES DAILY AS NEEDED FOR MUSCLE SPASM    Sent to pharmacy as: cyclobenzaprine (FLEXERIL) 10 MG tablet   Class: E-Prescribe   Order: [id number]   E-Prescribing Status: Receipt confirmed by pharmacy (9/24/2018  8:19 PM CDT)       Last Office Visit: 1/15/2019  Future Office visit:       Routing refill request to provider for review/approval because:  Drug not on the [facility], [facility] or [facility] refill protocol or controlled substance

## 2019-03-05 RX ORDER — CYCLOBENZAPRINE HCL 10 MG
TABLET ORAL
Qty: 90 TABLET | Refills: 3 | Status: SHIPPED | OUTPATIENT
Start: 2019-03-05 | End: 2019-07-06

## 2019-03-20 ENCOUNTER — ALLIED HEALTH/NURSE VISIT (OUTPATIENT)
Dept: FAMILY MEDICINE | Facility: CLINIC | Age: 54
End: 2019-03-20
Payer: COMMERCIAL

## 2019-03-20 VITALS — HEART RATE: 90 BPM | SYSTOLIC BLOOD PRESSURE: 136 MMHG | DIASTOLIC BLOOD PRESSURE: 85 MMHG

## 2019-03-20 DIAGNOSIS — Z01.30 BLOOD PRESSURE CHECK: Primary | ICD-10-CM

## 2019-03-20 PROCEDURE — 99207 ZZC NO CHARGE NURSE ONLY: CPT | Performed by: PHYSICIAN ASSISTANT

## 2019-03-20 NOTE — PROGRESS NOTES
Shon Bailey is enrolled/participating in the retail pharmacy Blood Pressure Goals Achievement Program (BPGAP).  Shon Bailey was evaluated at [facility] on March 20, 2019 at which time his blood pressure was:    BP Readings from Last 3 Encounters:   03/20/19 136/85   01/15/19 146/90   03/06/18 148/84     Reviewed lifestyle modifications for blood pressure control and reduction: including making healthy food choices, managing weight, getting regular exercise, smoking cessation, reducing alcohol consumption, monitoring blood pressure regularly.     Shon Bailey is not experiencing symptoms.    Follow-Up: BP is at goal of < 140/90mmHg (patient 18+ years of age with or without diabetes).  Recommended follow-up at pharmacy in 6 months.     Recommendation to Provider: None    This note completed by:     Re Live PharmD, [facility]  Pharmacist Manager   [facility]  [phone]

## 2019-04-15 ENCOUNTER — ALLIED HEALTH/NURSE VISIT (OUTPATIENT)
Dept: FAMILY MEDICINE | Facility: CLINIC | Age: 54
End: 2019-04-15
Payer: COMMERCIAL

## 2019-04-15 VITALS — DIASTOLIC BLOOD PRESSURE: 94 MMHG | HEART RATE: 85 BPM | SYSTOLIC BLOOD PRESSURE: 137 MMHG

## 2019-04-15 DIAGNOSIS — Z01.30 BLOOD PRESSURE CHECK: Primary | ICD-10-CM

## 2019-04-15 PROCEDURE — 99207 ZZC NO CHARGE NURSE ONLY: CPT | Performed by: PHYSICIAN ASSISTANT
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
## 2019-06-19 ENCOUNTER — ALLIED HEALTH/NURSE VISIT (OUTPATIENT)
Dept: FAMILY MEDICINE | Facility: CLINIC | Age: 54
End: 2019-06-19
Payer: COMMERCIAL

## 2019-06-19 VITALS — SYSTOLIC BLOOD PRESSURE: 139 MMHG | HEART RATE: 94 BPM | DIASTOLIC BLOOD PRESSURE: 83 MMHG

## 2019-06-19 DIAGNOSIS — Z01.30 BLOOD PRESSURE CHECK: Primary | ICD-10-CM

## 2019-06-19 PROCEDURE — 99207 ZZC NO CHARGE NURSE ONLY: CPT | Performed by: PHYSICIAN ASSISTANT

## 2019-06-19 NOTE — PROGRESS NOTES
Shon Bailey was evaluated at [facility] on June 19, 2019 at which time his blood pressure was:    BP Readings from Last 3 Encounters:   06/19/19 139/83   04/15/19 (!) 137/94   03/20/19 136/85     Pulse Readings from Last 3 Encounters:   06/19/19 94   04/15/19 85   03/20/19 90       Reviewed lifestyle modifications for blood pressure control and reduction: including making healthy food choices, managing weight, getting regular exercise, smoking cessation, reducing alcohol consumption, monitoring blood pressure regularly.     Symptoms: None    BP Goal:< 140/90 mmHg    BP Assessment:  BP at goal    Potential Reasons for BP too high: NA - Not applicable    BP Follow-Up Plan: Recheck BP in 6 months at pharmacy    Recommendation to Provider: None    Note completed by:     Re Live PharmD, [facility]  Pharmacist Manager   [facility]  [phone]
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
## 2019-07-06 DIAGNOSIS — M54.2 CERVICALGIA: ICD-10-CM

## 2019-07-06 DIAGNOSIS — M62.838 CERVICAL PARASPINAL MUSCLE SPASM: ICD-10-CM

## 2019-07-08 NOTE — TELEPHONE ENCOUNTER
cyclobenzaprine (FLEXERIL) 10 MG tablet 90 tablet 3 3/5/2019  No   Sig: TAKE ONE TABLET BY MOUTH THREE TIMES DAILY AS NEEDED FOR MUSCLE SPASM   Sent to pharmacy as: cyclobenzaprine (FLEXERIL) 10 MG tablet   Class: E-Prescribe   Order: [id number]   E-Prescribing Status: Receipt confirmed by pharmacy (3/5/2019  8:18 AM CST)       Last Office Visit: 1/15/2019  Future Office visit:       Routing refill request to provider for review/approval because:  Drug not on the [facility], [facility] or [facility] refill protocol or controlled substance

## 2019-07-09 RX ORDER — CYCLOBENZAPRINE HCL 10 MG
TABLET ORAL
Qty: 90 TABLET | Refills: 2 | Status: SHIPPED | OUTPATIENT
Start: 2019-07-09 | End: 2019-10-06

## 2019-08-26 ENCOUNTER — OFFICE VISIT (OUTPATIENT)
Dept: FAMILY MEDICINE | Facility: CLINIC | Age: 54
End: 2019-08-26
Payer: COMMERCIAL

## 2019-08-26 ENCOUNTER — ANCILLARY PROCEDURE (OUTPATIENT)
Dept: MRI IMAGING | Facility: CLINIC | Age: 54
End: 2019-08-26
Attending: NURSE PRACTITIONER
Payer: COMMERCIAL

## 2019-08-26 VITALS
HEART RATE: 85 BPM | TEMPERATURE: 98.9 F | BODY MASS INDEX: 32.82 KG/M2 | DIASTOLIC BLOOD PRESSURE: 92 MMHG | SYSTOLIC BLOOD PRESSURE: 152 MMHG | OXYGEN SATURATION: 96 % | WEIGHT: 242 LBS

## 2019-08-26 DIAGNOSIS — M54.50 ACUTE RIGHT-SIDED LOW BACK PAIN WITHOUT SCIATICA: Primary | ICD-10-CM

## 2019-08-26 DIAGNOSIS — M51.369 L4-L5 DISC BULGE: ICD-10-CM

## 2019-08-26 DIAGNOSIS — R03.0 ELEVATED BP WITHOUT DIAGNOSIS OF HYPERTENSION: ICD-10-CM

## 2019-08-26 DIAGNOSIS — M54.50 ACUTE RIGHT-SIDED LOW BACK PAIN WITHOUT SCIATICA: ICD-10-CM

## 2019-08-26 PROCEDURE — 72148 MRI LUMBAR SPINE W/O DYE: CPT | Mod: TC

## 2019-08-26 PROCEDURE — 99214 OFFICE O/P EST MOD 30 MIN: CPT | Performed by: NURSE PRACTITIONER

## 2019-08-26 RX ORDER — PREDNISONE 20 MG/1
TABLET ORAL
Qty: 18 TABLET | Refills: 0 | Status: SHIPPED | OUTPATIENT
Start: 2019-08-26 | End: 2020-05-11

## 2019-08-26 RX ORDER — HYDROCODONE BITARTRATE AND ACETAMINOPHEN 5; 325 MG/1; MG/1
1 TABLET ORAL EVERY 8 HOURS PRN
Qty: 20 TABLET | Refills: 0 | Status: SHIPPED | OUTPATIENT
Start: 2019-08-26 | End: 2019-08-29

## 2019-08-26 ASSESSMENT — PAIN SCALES - GENERAL: PAINLEVEL: EXTREME PAIN (9)

## 2019-08-26 ASSESSMENT — PATIENT HEALTH QUESTIONNAIRE - PHQ9: SUM OF ALL RESPONSES TO PHQ QUESTIONS 1-9: 7

## 2019-08-26 NOTE — RESULT ENCOUNTER NOTE
I spoke with patient regarding results  Should improve with medications and physical therapy   If not improving, return to clinic for further workup
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
## 2019-08-26 NOTE — PATIENT INSTRUCTIONS
Do not take Norco within 24 hours of Xanax    If you develop sudden numbness in the buttocks or groin, weakness or numbness of your leg(s) or sudden loss of bowel or bladder function you need to call 911 or be brought to an emergency room right away    If your pain worsens and you need to be seen right away, there is a walk in orthopedic clinic at [address] [place] [address] in [place]. [phone]

## 2019-09-10 ENCOUNTER — THERAPY VISIT (OUTPATIENT)
Dept: PHYSICAL THERAPY | Facility: CLINIC | Age: 54
End: 2019-09-10
Attending: NURSE PRACTITIONER
Payer: COMMERCIAL

## 2019-09-10 DIAGNOSIS — M51.369 L4-L5 DISC BULGE: ICD-10-CM

## 2019-09-10 DIAGNOSIS — M54.50 ACUTE RIGHT-SIDED LOW BACK PAIN WITHOUT SCIATICA: ICD-10-CM

## 2019-09-10 DIAGNOSIS — M54.50 ACUTE BILATERAL LOW BACK PAIN WITHOUT SCIATICA: Primary | ICD-10-CM

## 2019-09-10 PROCEDURE — 97161 PT EVAL LOW COMPLEX 20 MIN: CPT | Mod: GP | Performed by: PHYSICAL THERAPIST

## 2019-09-10 PROCEDURE — 97530 THERAPEUTIC ACTIVITIES: CPT | Mod: GP | Performed by: PHYSICAL THERAPIST

## 2019-09-10 PROCEDURE — 97110 THERAPEUTIC EXERCISES: CPT | Mod: GP | Performed by: PHYSICAL THERAPIST
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
## 2019-09-17 ENCOUNTER — THERAPY VISIT (OUTPATIENT)
Dept: PHYSICAL THERAPY | Facility: CLINIC | Age: 54
End: 2019-09-17
Payer: COMMERCIAL

## 2019-09-17 DIAGNOSIS — M54.50 ACUTE BILATERAL LOW BACK PAIN WITHOUT SCIATICA: ICD-10-CM

## 2019-09-17 PROCEDURE — 97110 THERAPEUTIC EXERCISES: CPT | Mod: GP | Performed by: PHYSICAL THERAPIST

## 2019-09-24 ENCOUNTER — THERAPY VISIT (OUTPATIENT)
Dept: PHYSICAL THERAPY | Facility: CLINIC | Age: 54
End: 2019-09-24
Payer: COMMERCIAL

## 2019-09-24 DIAGNOSIS — M54.50 ACUTE BILATERAL LOW BACK PAIN WITHOUT SCIATICA: ICD-10-CM

## 2019-09-24 PROCEDURE — 97110 THERAPEUTIC EXERCISES: CPT | Mod: GP | Performed by: PHYSICAL THERAPIST

## 2019-10-06 DIAGNOSIS — M62.838 CERVICAL PARASPINAL MUSCLE SPASM: ICD-10-CM

## 2019-10-06 DIAGNOSIS — M54.2 CERVICALGIA: ICD-10-CM
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
## 2019-10-07 NOTE — TELEPHONE ENCOUNTER
cyclobenzaprine (FLEXERIL) 10 MG tablet      Last Written Prescription Date:  7/9/2019  Last Fill Quantity: 90 tablet,   # refills: 2  Last Office Visit: 1/15/2019 THADDEUS Ausitn    Future Office visit:       Routing refill request to provider for review/approval because:  Drug not on the [facility], P or [facility] refill protocol or controlled substance

## 2019-10-08 ENCOUNTER — THERAPY VISIT (OUTPATIENT)
Dept: PHYSICAL THERAPY | Facility: CLINIC | Age: 54
End: 2019-10-08
Payer: COMMERCIAL

## 2019-10-08 DIAGNOSIS — M54.50 ACUTE BILATERAL LOW BACK PAIN WITHOUT SCIATICA: ICD-10-CM

## 2019-10-08 PROCEDURE — 97110 THERAPEUTIC EXERCISES: CPT | Mod: GP | Performed by: PHYSICAL THERAPIST

## 2019-10-08 NOTE — PROGRESS NOTES
DISCHARGE REPORT    Progress reporting period is from 9.10.19 to 10.8.19.       SUBJECTIVE  Subjective changes noted by patient:  Pt states low back is much better since last visit.  Went on vacation to [place] and had no issues with traveling or at the [employer] park.  Has still been doing stretch 5 x daily.    Current Pain level: 0/10.     Initial Pain level: (3-5/10).   Changes in function:  Yes (See Goal flowsheet attached for changes in current functional level)  Adverse reaction to treatment or activity: None    OBJECTIVE  Objective: L/S AROM:  Flex Min loss; Ext Min-Mod loss; SG Min-Mod loss (B).  ROM improves all dir after repeated FISit.      ASSESSMENT/PLAN  Updated problem list and treatment plan:   Diagnosis 1:  LBP    Decreased ROM/flexibility - therapeutic exercise and home program  Decreased joint mobility - therapeutic exercise and home program  Impaired muscle performance - home program  STG/LTGs have been met or progress has been made towards goals:  Yes (See Goal flow sheet completed today.)  Assessment of Progress: The patient's condition is improving.  The patient has met all of their long term goals.  Self Management Plans:  Patient is independent in a home treatment program.  Patient is independent in self management of symptoms.  I have re-evaluated this patient and find that the nature, scope, duration and intensity of the therapy is appropriate for the medical condition of the patient.  Shon continues to require the following intervention to meet STG and LTG's:  PT intervention is no longer required to meet STG/LTG.    Recommendations:  This patient is ready to be discharged from therapy and continue their home treatment program.

## 2019-10-09 RX ORDER — CYCLOBENZAPRINE HCL 10 MG
TABLET ORAL
Qty: 90 TABLET | Refills: 1 | Status: SHIPPED | OUTPATIENT
Start: 2019-10-09 | End: 2020-01-07
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
## 2020-01-05 DIAGNOSIS — M62.838 CERVICAL PARASPINAL MUSCLE SPASM: ICD-10-CM

## 2020-01-05 DIAGNOSIS — M54.2 CERVICALGIA: ICD-10-CM

## 2020-01-06 NOTE — TELEPHONE ENCOUNTER
Requested Prescriptions   Pending Prescriptions Disp Refills     cyclobenzaprine (FLEXERIL) 10 MG tablet [Pharmacy Med Name: Cyclobenzaprine HCl Oral Tablet 10 MG]        Last Written Prescription Date:  10/9/2019  Last Fill Quantity: 90 tablet,   # refills: 1  Last Office Visit: 1/15/2019  THADDEUS Austin    Future Office visit:       Routing refill request to provider for review/approval because:  Drug not on the [facility], [facility] or  [facility] refill protocol or controlled substance 90 tablet 0     Sig: TAKE 1 TABLET BY MOUTH THREE TIMES DAILY AS NEEDED FOR MUSCLE SPASM       There is no refill protocol information for this order

## 2020-01-07 RX ORDER — CYCLOBENZAPRINE HCL 10 MG
10 TABLET ORAL 3 TIMES DAILY PRN
Qty: 30 TABLET | Refills: 0 | Status: SHIPPED | OUTPATIENT
Start: 2020-01-07 | End: 2020-02-05

## 2020-02-02 DIAGNOSIS — S06.9X0S TRAUMATIC BRAIN INJURY, WITHOUT LOSS OF CONSCIOUSNESS, SEQUELA (H): ICD-10-CM
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
## 2020-02-03 DIAGNOSIS — F41.0 PANIC ATTACK: ICD-10-CM

## 2020-02-03 DIAGNOSIS — M54.2 CERVICALGIA: ICD-10-CM

## 2020-02-03 DIAGNOSIS — R51.9 NONINTRACTABLE EPISODIC HEADACHE, UNSPECIFIED HEADACHE TYPE: ICD-10-CM

## 2020-02-03 DIAGNOSIS — M54.2 NECK PAIN: ICD-10-CM

## 2020-02-03 DIAGNOSIS — F07.81 POST CONCUSSION SYNDROME: ICD-10-CM

## 2020-02-03 DIAGNOSIS — M62.838 CERVICAL PARASPINAL MUSCLE SPASM: ICD-10-CM

## 2020-02-03 NOTE — TELEPHONE ENCOUNTER
"Requested Prescriptions   Pending Prescriptions Disp Refills     verapamil ER (CALAN-SR) 240 MG CR tablet [Pharmacy Med Name: Verapamil HCl ER Oral Tablet Extended Release 240 MG]  Last Written Prescription Date:  1/15/2019  Last Fill Quantity: 90 tablet,  # refills: 3   Last office visit: 1/15/2019 with prescribing provider:  THADDEUS Islas   Future Office Visit:   27 tablet 0     Sig: TAKE 1 TABLET (240 MG) BY MOUTH ONCE DAILY       Calcium Channel Blockers Protocol  Failed - 2/2/2020  2:45 PM        Failed - Blood pressure under 140/90 in past 12 months     BP Readings from Last 3 Encounters:   08/26/19 (!) 152/92   06/19/19 139/83   04/15/19 (!) 137/94             Failed - Normal ALT in past 12 months     No lab results found.          Failed - Recent (12 mo) or future (30 days) visit within the authorizing provider's specialty     Patient has had an office visit with the authorizing provider or a provider within the authorizing providers department within the previous 12 mos or has a future within next 30 days. See \"Patient Info\" tab in inbasket, or \"Choose Columns\" in Meds & Orders section of the refill encounter.              Failed - Normal serum creatinine on file in past 12 months     Recent Labs   Lab Test 01/15/19  1138   CR 0.95             Passed - Medication is active on med list        Passed - Patient is age 18 or older        "

## 2020-02-04 RX ORDER — VERAPAMIL HYDROCHLORIDE 240 MG/1
240 TABLET, FILM COATED, EXTENDED RELEASE ORAL DAILY
Qty: 90 TABLET | Refills: 0 | Status: SHIPPED | OUTPATIENT
Start: 2020-02-04 | End: 2020-05-05

## 2020-02-04 NOTE — TELEPHONE ENCOUNTER
"Requested Prescriptions   Pending Prescriptions Disp Refills     cyclobenzaprine (FLEXERIL) 10 MG tablet [Pharmacy Med Name: Cyclobenzaprine HCl Oral Tablet 10 MG]  Last Written Prescription Date:  1/7/2020  Last Fill Quantity: 30 tabs,  # refills: 0   Last office visit: 8/26/2019 with prescribing provider:  Roshan Wu Office Visit:   30 tablet 0     Sig: TAKE 1 TABLET BY MOUTH THREE TIMES DAILY AS NEEDED FOR MUSCLE SPASMS. DUE FOR OFFICE VISIT       There is no refill protocol information for this order        nortriptyline (PAMELOR) 50 MG capsule [Pharmacy Med Name: Nortriptyline HCl Oral Capsule 50 MG]  Last Written Prescription Date:  1/15/2019  Last Fill Quantity: 90 caps,  # refills: 3   Last office visit: 8/26/2019 with prescribing provider:  Roshan Wu Office Visit:   30 capsule 2     Sig: Take 1 capsule (50 mg) by mouth at bedtime.       Tricyclic Agents ( Annual appt and no PHQ9) Failed - 2/3/2020 12:15 PM        Failed - Blood Pressure under 140/90 in past 12 mos     BP Readings from Last 3 Encounters:   08/26/19 (!) 152/92   06/19/19 139/83   04/15/19 (!) 137/94                 Failed - Recent (12 mo) or future (30 days) visit within authorizing provider's specialty     Patient has had an office visit with the authorizing provider or a provider within the authorizing providers department within the previous 12 mos or has a future within next 30 days. See \"Patient Info\" tab in inbasket, or \"Choose Columns\" in Meds & Orders section of the refill encounter.              Passed - Medication is active on med list        Passed - Patient is age 18 or older         "

## 2020-02-05 NOTE — TELEPHONE ENCOUNTER
Routing refill request to provider for review/approval because:  Drug not on the [facility] refill protocol  Patient is due to be seen, please route message to team after review of medication request to reach out to the patient to get a follow up scheduled. Thank you.     Valerie Dalton RN

## 2020-02-06 RX ORDER — NORTRIPTYLINE HYDROCHLORIDE 50 MG/1
50 CAPSULE ORAL AT BEDTIME
Qty: 30 CAPSULE | Refills: 0 | Status: SHIPPED | OUTPATIENT
Start: 2020-02-06 | End: 2020-02-27

## 2020-02-06 RX ORDER — CYCLOBENZAPRINE HCL 10 MG
10 TABLET ORAL 3 TIMES DAILY PRN
Qty: 90 TABLET | Refills: 0 | Status: SHIPPED | OUTPATIENT
Start: 2020-02-06 | End: 2020-05-05

## 2020-02-26 DIAGNOSIS — F07.81 POST CONCUSSION SYNDROME: ICD-10-CM

## 2020-02-26 DIAGNOSIS — F41.0 PANIC ATTACK: ICD-10-CM

## 2020-02-26 DIAGNOSIS — M54.2 NECK PAIN: ICD-10-CM

## 2020-02-26 DIAGNOSIS — R51.9 NONINTRACTABLE EPISODIC HEADACHE, UNSPECIFIED HEADACHE TYPE: ICD-10-CM

## 2020-02-26 NOTE — TELEPHONE ENCOUNTER
"Requested Prescriptions   Pending Prescriptions Disp Refills     nortriptyline (PAMELOR) 50 MG capsule [Pharmacy Med Name: Nortriptyline HCl Oral Capsule 50 MG]  Last Written Prescription Date:  2/6/2020  Last Fill Quantity: 30 capsule,  # refills: 0   Last office visit: 1/15/2019 with prescribing provider:  THADDEUS Islas   Future Office Visit:   30 capsule 0     Sig: Take 1 capsule (50 mg) by mouth At Bedtime. Need appointment before this refill runs out.       Tricyclic Agents ( Annual appt and no PHQ9) Failed - 2/26/2020  1:48 PM        Failed - Blood Pressure under 140/90 in past 12 mos     BP Readings from Last 3 Encounters:   08/26/19 (!) 152/92   06/19/19 139/83   04/15/19 (!) 137/94             Failed - Recent (12 mo) or future (30 days) visit within authorizing provider's specialty     Patient has had an office visit with the authorizing provider or a provider within the authorizing providers department within the previous 12 mos or has a future within next 30 days. See \"Patient Info\" tab in inbasket, or \"Choose Columns\" in Meds & Orders section of the refill encounter.              Passed - Medication is active on med list        Passed - Patient is age 18 or older        "

## 2020-02-27 RX ORDER — NORTRIPTYLINE HYDROCHLORIDE 50 MG/1
50 CAPSULE ORAL AT BEDTIME
Qty: 30 CAPSULE | Refills: 0 | Status: SHIPPED | OUTPATIENT
Start: 2020-02-27 | End: 2020-03-24

## 2020-02-27 NOTE — TELEPHONE ENCOUNTER
Routing refill request to provider for review/approval because:  Melody given x1 and patient did not follow up, please advise    BP Readings from Last 3 Encounters:   08/26/19 (!) 152/92   06/19/19 139/83   04/15/19 (!) 137/94     Sarah Dumont, RN, BSN, PHN  [facility]: [place]

## 2020-03-23 DIAGNOSIS — F41.0 PANIC ATTACK: ICD-10-CM

## 2020-03-23 DIAGNOSIS — M54.2 NECK PAIN: ICD-10-CM

## 2020-03-23 DIAGNOSIS — R51.9 NONINTRACTABLE EPISODIC HEADACHE, UNSPECIFIED HEADACHE TYPE: ICD-10-CM

## 2020-03-23 DIAGNOSIS — F07.81 POST CONCUSSION SYNDROME: ICD-10-CM

## 2020-03-23 NOTE — TELEPHONE ENCOUNTER
"Requested Prescriptions   Pending Prescriptions Disp Refills     nortriptyline (PAMELOR) 50 MG capsule [Pharmacy Med Name: Nortriptyline HCl Oral Capsule 50 MG]  Last Written Prescription Date:  2/27/2020  Last Fill Quantity: 30 caps,  # refills: 0   Last office visit: 8/26/2019 with prescribing provider:  Roshan   Future Office Visit:   30 capsule 0     Sig: TAKE 1 CAPSULE (50 MG) BY MOUTH AT BEDTIME +++RELATED VISIT DUE+++       Tricyclic Agents ( Annual appt and no PHQ9) Failed - 3/23/2020 11:11 AM        Failed - Blood Pressure under 140/90 in past 12 mos     BP Readings from Last 3 Encounters:   08/26/19 (!) 152/92   06/19/19 139/83   04/15/19 (!) 137/94                 Failed - Recent (12 mo) or future (30 days) visit within authorizing provider's specialty     Patient has had an office visit with the authorizing provider or a provider within the authorizing providers department within the previous 12 mos or has a future within next 30 days. See \"Patient Info\" tab in inbasket, or \"Choose Columns\" in Meds & Orders section of the refill encounter.              Passed - Medication is active on med list        Passed - Patient is age 18 or older            "

## 2020-03-24 RX ORDER — NORTRIPTYLINE HYDROCHLORIDE 50 MG/1
50 CAPSULE ORAL AT BEDTIME
Qty: 90 CAPSULE | Refills: 0 | Status: SHIPPED | OUTPATIENT
Start: 2020-03-24 | End: 2020-08-13

## 2020-03-24 NOTE — TELEPHONE ENCOUNTER
"Requested Prescriptions   Pending Prescriptions Disp Refills     nortriptyline (PAMELOR) 50 MG capsule [Pharmacy Med Name: Nortriptyline HCl Oral Capsule 50 MG] 30 capsule 0     Sig: TAKE 1 CAPSULE (50 MG) BY MOUTH AT BEDTIME +++RELATED VISIT DUE+++       Tricyclic Agents ( Annual appt and no PHQ9) Failed - 3/23/2020  4:08 PM        Failed - Blood Pressure under 140/90 in past 12 mos     BP Readings from Last 3 Encounters:   08/26/19 (!) 152/92   06/19/19 139/83   04/15/19 (!) 137/94                 Failed - Recent (12 mo) or future (30 days) visit within authorizing provider's specialty     Patient has had an office visit with the authorizing provider or a provider within the authorizing providers department within the previous 12 mos or has a future within next 30 days. See \"Patient Info\" tab in inbasket, or \"Choose Columns\" in Meds & Orders section of the refill encounter.              Passed - Medication is active on med list        Passed - Patient is age 18 or older             Routing refill request to provider for review/approval because:  Vitals out of range:  Blood pressure        "

## 2020-05-01 ENCOUNTER — TELEPHONE (OUTPATIENT)
Dept: FAMILY MEDICINE | Facility: CLINIC | Age: 55
End: 2020-05-01
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
## 2020-05-01 DIAGNOSIS — M62.838 CERVICAL PARASPINAL MUSCLE SPASM: ICD-10-CM

## 2020-05-01 DIAGNOSIS — S06.9X0S TRAUMATIC BRAIN INJURY, WITHOUT LOSS OF CONSCIOUSNESS, SEQUELA (H): ICD-10-CM

## 2020-05-01 DIAGNOSIS — M54.2 CERVICALGIA: ICD-10-CM

## 2020-05-01 NOTE — TELEPHONE ENCOUNTER
"Requested Prescriptions   Pending Prescriptions Disp Refills     cyclobenzaprine (FLEXERIL) 10 MG tablet [Pharmacy Med Name: Cyclobenzaprine HCl Oral Tablet 10 MG]        Last Written Prescription Date:  2/6/2020  Last Fill Quantity: 90 tablet,   # refills: 0  Last Office Visit: 1/15/2019 w/ THADDEUS Islas    Future Office visit:       Routing refill request to provider for review/approval because:  Drug not on the [facility] [email or web address] or [facility] refill protocol or controlled substance 90 tablet 0     Sig: Take 1 tablet (10 mg) by mouth 3 times daily as needed for muscle spasms. Need appointment before this refill runs out.       There is no refill protocol information for this order                verapamil ER (CALAN-SR) 240 MG CR tablet [Pharmacy Med Name: Verapamil HCl ER Oral Tablet Extended Release 240 MG]  Last Written Prescription Date:  2/4/2020  Last Fill Quantity: 90 tablet,  # refills: 0   Last office visit: 1/15/2019 with prescribing provider:  THADDEUS Islas   Future Office Visit:   90 tablet 0     Sig: TAKE ONE TABLET BY MOUTH ONE TIME DAILY. DUE FOR OFFICE VISIT.       Calcium Channel Blockers Protocol  Failed - 5/1/2020 12:05 PM        Failed - Blood pressure under 140/90 in past 12 months     BP Readings from Last 3 Encounters:   08/26/19 (!) 152/92   06/19/19 139/83   04/15/19 (!) 137/94                 Failed - Normal ALT in past 12 months     No lab results found.          Failed - Recent (12 mo) or future (30 days) visit within the authorizing provider's specialty     Patient has had an office visit with the authorizing provider or a provider within the authorizing providers department within the previous 12 mos or has a future within next 30 days. See \"Patient Info\" tab in inbasket, or \"Choose Columns\" in Meds & Orders section of the refill encounter.              Failed - Normal serum creatinine on file in past 12 months     Recent Labs   Lab Test 01/15/19  1138   CR 0.95       Ok to refill medication if " creatinine is low          Passed - Medication is active on med list        Passed - Patient is age 18 or older

## 2020-05-05 RX ORDER — CYCLOBENZAPRINE HCL 10 MG
TABLET ORAL
Qty: 30 TABLET | Refills: 0 | Status: SHIPPED | OUTPATIENT
Start: 2020-05-05 | End: 2020-05-11

## 2020-05-05 RX ORDER — VERAPAMIL HYDROCHLORIDE 240 MG/1
TABLET, FILM COATED, EXTENDED RELEASE ORAL
Qty: 30 TABLET | Refills: 0 | Status: SHIPPED | OUTPATIENT
Start: 2020-05-05 | End: 2020-05-11

## 2020-05-05 NOTE — TELEPHONE ENCOUNTER
Patient has not followed up with PCP as recommended at last refill in February. Route to provider to advise.    Nasreen Patel, Nimisha.D., [facility]  Medication Therapy Management Pharmacist  [phone]

## 2020-05-11 ENCOUNTER — VIRTUAL VISIT (OUTPATIENT)
Dept: FAMILY MEDICINE | Facility: CLINIC | Age: 55
End: 2020-05-11
Payer: COMMERCIAL

## 2020-05-11 DIAGNOSIS — R51.9 NONINTRACTABLE EPISODIC HEADACHE, UNSPECIFIED HEADACHE TYPE: ICD-10-CM

## 2020-05-11 DIAGNOSIS — F32.0 MILD MAJOR DEPRESSION (H): ICD-10-CM

## 2020-05-11 DIAGNOSIS — I10 HYPERTENSION GOAL BP (BLOOD PRESSURE) < 140/90: ICD-10-CM

## 2020-05-11 DIAGNOSIS — S06.9X0S TRAUMATIC BRAIN INJURY, WITHOUT LOSS OF CONSCIOUSNESS, SEQUELA (H): Primary | ICD-10-CM

## 2020-05-11 DIAGNOSIS — F41.1 GAD (GENERALIZED ANXIETY DISORDER): ICD-10-CM

## 2020-05-11 DIAGNOSIS — H51.9 CONVERGENCE INSUFFICIENCY OR PALSY IN BINOCULAR EYE MOVEMENT: ICD-10-CM

## 2020-05-11 DIAGNOSIS — M54.2 CERVICALGIA: ICD-10-CM

## 2020-05-11 DIAGNOSIS — Z12.11 SPECIAL SCREENING FOR MALIGNANT NEOPLASMS, COLON: ICD-10-CM

## 2020-05-11 DIAGNOSIS — M62.838 CERVICAL PARASPINAL MUSCLE SPASM: ICD-10-CM

## 2020-05-11 PROCEDURE — 99214 OFFICE O/P EST MOD 30 MIN: CPT | Mod: 95 | Performed by: PHYSICIAN ASSISTANT

## 2020-05-11 RX ORDER — CYCLOBENZAPRINE HCL 10 MG
10 TABLET ORAL 3 TIMES DAILY PRN
Qty: 90 TABLET | Refills: 1 | Status: SHIPPED | OUTPATIENT
Start: 2020-05-11 | End: 2020-07-14

## 2020-05-11 RX ORDER — VERAPAMIL HYDROCHLORIDE 120 MG/1
120 TABLET, FILM COATED, EXTENDED RELEASE ORAL AT BEDTIME
Qty: 7 TABLET | Refills: 0 | Status: SHIPPED | OUTPATIENT
Start: 2020-05-11 | End: 2020-05-28

## 2020-05-11 RX ORDER — VERAPAMIL HYDROCHLORIDE 240 MG/1
TABLET, FILM COATED, EXTENDED RELEASE ORAL
Qty: 30 TABLET | Refills: 0 | Status: CANCELLED | OUTPATIENT
Start: 2020-05-11

## 2020-05-11 RX ORDER — AMLODIPINE BESYLATE 5 MG/1
5 TABLET ORAL DAILY
Qty: 30 TABLET | Refills: 0 | Status: SHIPPED | OUTPATIENT
Start: 2020-05-11 | End: 2020-05-28
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
## 2020-05-19 ENCOUNTER — TELEPHONE (OUTPATIENT)
Dept: FAMILY MEDICINE | Facility: CLINIC | Age: 55
End: 2020-05-19

## 2020-05-19 DIAGNOSIS — I10 HYPERTENSION GOAL BP (BLOOD PRESSURE) < 140/90: Primary | ICD-10-CM

## 2020-05-19 NOTE — TELEPHONE ENCOUNTER
"Routing to PCP to check status of BP cuff.  I see DME ordered during virtual visit, but no further correspondence.  I'm unsure of process for virtual visits-does TC watch for these and fax orders?  Do you still have the order?    Per PCP virtual visit note from 5/11/20 indicates patient to wean off of verapamil over a week and change to amlodipine.  Note pasted below. This was relayed to patient, who voices understanding.  He also inquires about BP cuff.  RN sees that DME order placed and printed, but don't see any encounter about it being faxed. Will check with PCP.     Josie Griffith RN        \"9. Hypertension goal BP (blood pressure) < 140/90  Reviewed - he wants to stop the Verapamil due to perceived lack of benefit. Will change to Amlodipine after a slow wean down. This prescription is given with a discussion of side effects, risks and proper use.  Instructions are given to follow up if not improving or symptoms change or worsen as discussed. Recheck with me in early June via [facility] or if we are seeing patients in clinic.   - verapamil ER (CALAN-SR) 120 MG CR tablet; Take 1 tablet (120 mg) by mouth At Bedtime (As wean down plan)  Dispense: 7 tablet; Refill: 0  - amLODIPine (NORVASC) 5 MG tablet; Take 1 tablet (5 mg) by mouth daily  Dispense: 30 tablet; Refill: 0  - order for DME; BP cuff, brand as covered by insurance.  Dx: HTN  Dispense: 1 each; Refill: 0\"  "

## 2020-05-19 NOTE — TELEPHONE ENCOUNTER
Huddled with PCP, who reprinted and signed DME order.  RN handed off to TC to fax, and patient notified.     Josie Griffith RN

## 2020-05-19 NOTE — TELEPHONE ENCOUNTER
Reason for Call:  Other     Detailed comments: patient would like to talk with a nurse or provider about the prescription verapamil ER (CALAN-SR) the patient thought that he was supposed to take this medication for a longer time please call to discuss and send prescription to [facility] [address] [place] [address], [place] - [address] [place] [address]     Phone Number Patient can be reached at: Home number on file [phone] (home)    Best Time: any    Can we leave a detailed message on this number? YES    Call taken on 5/19/2020 at 2:14 PM by Isabell Mancia

## 2020-05-21 ENCOUNTER — TELEPHONE (OUTPATIENT)
Dept: FAMILY MEDICINE | Facility: CLINIC | Age: 55
End: 2020-05-21

## 2020-05-21 NOTE — TELEPHONE ENCOUNTER
Cologuard order signed by provider and faxed to [employer] at [phone]. [employer] will mail the cologuard kit to patient with instructions, and call patient to answer any questions. Patient can call [employer] at [phone] with any questions.    Bang Crespo

## 2020-05-27 ENCOUNTER — TELEPHONE (OUTPATIENT)
Dept: FAMILY MEDICINE | Facility: CLINIC | Age: 55
End: 2020-05-27

## 2020-05-27 DIAGNOSIS — S06.9X0S TRAUMATIC BRAIN INJURY, WITHOUT LOSS OF CONSCIOUSNESS, SEQUELA (H): ICD-10-CM

## 2020-05-27 NOTE — TELEPHONE ENCOUNTER
"Routing request to restart verapamil 240mg to PCP.    Called patient for more information, as he just had virtual visit with PCP on 5/11/20 and requested wean off of this medication due to \"perceived lack of benefit\", so was weaned off and switched to amlodipine.  He had been taking verapamil 240mg, then decreased to 120mg on 5/11 with plan to take for 7 days then switch over to amlodipine 5mg daily.  Patient reports verapamil initial prescribed by neurology for headaches r/t TBI.  Since the first day of taking decreased dose of verapamil on 5/11, headaches returned and have come back full-blown now with being fully weaned off.  He reports taking amlodipine 5mg daily as directed.  He has not checked his blood pressure, denies other sx HTN such as chest pain, blurred vision or dizziness.  He has had occasional bloody noses and ringing in ears since his TBI and doesn't feel HTN related.  He would like to go back on the verapamil for his chronic headaches.  Instructed patient that will forward to PCP for review tomorrow.     Josie Griffith RN    "

## 2020-05-27 NOTE — TELEPHONE ENCOUNTER
Reason for Call:  Medication or medication refill:    Do you use a [facility]?  Name of the pharmacy and phone number for the current request:  [facility] [address] [place], [place] [phone] (Phone)  [phone] (Fax)       Name of the medication requested: verapamil ER (CALAN-SR) 120 MG CR tabletTake 1 tablet (120 mg) by mouth At Bedtime (As wean down plan)       Other request: PT states wants to go back to original dosage as headaches are back full blown.    Can we leave a detailed message on this number? YES    Phone number patient can be reached at: Home number on file [phone] (home)    Best Time: Anytime    Call taken on 5/27/2020 at 4:46 PM by Alyssa Ham

## 2020-05-28 RX ORDER — VERAPAMIL HYDROCHLORIDE 240 MG/1
240 TABLET, FILM COATED, EXTENDED RELEASE ORAL DAILY
Qty: 90 TABLET | Refills: 1 | Status: SHIPPED | OUTPATIENT
Start: 2020-05-28 | End: 2020-12-15

## 2020-05-28 NOTE — TELEPHONE ENCOUNTER
Patient informed of refill. Advised to stop Amlodipine as this was discontinued by PCP.      Nanci Nuñez RN

## 2020-06-09 ENCOUNTER — TELEPHONE (OUTPATIENT)
Dept: FAMILY MEDICINE | Facility: CLINIC | Age: 55
End: 2020-06-09

## 2020-06-09 ENCOUNTER — TRANSFERRED RECORDS (OUTPATIENT)
Dept: HEALTH INFORMATION MANAGEMENT | Facility: CLINIC | Age: 55
End: 2020-06-09

## 2020-06-09 DIAGNOSIS — I10 HYPERTENSION GOAL BP (BLOOD PRESSURE) < 140/90: ICD-10-CM

## 2020-06-09 LAB — COLOGUARD-ABSTRACT: NEGATIVE

## 2020-06-09 NOTE — TELEPHONE ENCOUNTER
Reason for Call:  Other prescription (Blood Pressure Monitor cuff)    Detailed comments: Pt's wife would lie a call back to discuss if a new Rx  has been submitted for the correct blood pressure monitor cuff and also she has questions regarding Cologuard.    Phone Number Patient can be reached at: Home number on file [phone] (home)    Best Time: ASAP    Can we leave a detailed message on this number? NO    Call taken on 6/9/2020 at 11:13 AM by Taylor Berman

## 2020-06-09 NOTE — TELEPHONE ENCOUNTER
"    The order is for \"BP Cuff\" which can be literally translated as the entire unit - including the machine. This is the same DME order that has been in Epic for 7+ years and not a single pharmacy has ever had concerns regarding the order and what it means.     So, the order means we want the machine and not just the arm portion. If that is not okay with the pharmacy, direct Shon to [facility] so he can get better service and let his current pharmacy know that they are the first ones who have ever had an issue with this order.    Thanks.  Isaiah Islas, MPAS, PA-C       "

## 2020-06-09 NOTE — TELEPHONE ENCOUNTER
Routing to PCP to please advise.        Patient called needing a new DME order for BP machine/cuff.  Previous DME order was for a cuff only and medical supply store did not want to send until certain it was correct order    Message also indicated they had questions regarding cologaurd but when asked she states they figured it out and sent sample.    Pended order, please check if correct/ok    Nanci Nuñez RN

## 2020-06-10 NOTE — TELEPHONE ENCOUNTER
Patient states he already picked up the BP machine and cuff from his pharmacy and no further action is needed.     Instructed patient to call [employer] at [phone] for his Cologuard question.    Isaac Yoder RN

## 2020-06-10 NOTE — TELEPHONE ENCOUNTER
Patient/family was instructed to return call to [facility], directly on the RN Call back line at [phone].    Isaac Yoder RN

## 2020-06-23 ENCOUNTER — TELEPHONE (OUTPATIENT)
Dept: FAMILY MEDICINE | Facility: CLINIC | Age: 55
End: 2020-06-23

## 2020-06-23 NOTE — TELEPHONE ENCOUNTER
Received a Negative Cologuard Result fax from [facility].    Placed in Mathew Islas Sign me folder.    Anita Barney

## 2020-07-13 DIAGNOSIS — M54.2 CERVICALGIA: ICD-10-CM

## 2020-07-13 DIAGNOSIS — M62.838 CERVICAL PARASPINAL MUSCLE SPASM: ICD-10-CM

## 2020-07-14 RX ORDER — CYCLOBENZAPRINE HCL 10 MG
10 TABLET ORAL 3 TIMES DAILY PRN
Qty: 90 TABLET | Refills: 2 | Status: SHIPPED | OUTPATIENT
Start: 2020-07-14 | End: 2021-02-07

## 2020-07-14 NOTE — TELEPHONE ENCOUNTER
Routing refill request to provider for review/approval because:  Drug not on the [facility] refill protocol     Sarah Dumont RN, BSN, PHN  [facility]: [place]

## 2020-08-13 DIAGNOSIS — F41.0 PANIC ATTACK: ICD-10-CM

## 2020-08-13 DIAGNOSIS — M54.2 NECK PAIN: ICD-10-CM

## 2020-08-13 DIAGNOSIS — F07.81 POST CONCUSSION SYNDROME: ICD-10-CM

## 2020-08-13 DIAGNOSIS — R51.9 NONINTRACTABLE EPISODIC HEADACHE, UNSPECIFIED HEADACHE TYPE: ICD-10-CM

## 2020-08-13 RX ORDER — NORTRIPTYLINE HYDROCHLORIDE 50 MG/1
CAPSULE ORAL
Qty: 90 CAPSULE | Refills: 1 | Status: SHIPPED | OUTPATIENT
Start: 2020-08-13 | End: 2021-02-07

## 2020-12-14 DIAGNOSIS — S06.9X0S TRAUMATIC BRAIN INJURY, WITHOUT LOSS OF CONSCIOUSNESS, SEQUELA (H): ICD-10-CM

## 2020-12-15 RX ORDER — VERAPAMIL HYDROCHLORIDE 240 MG/1
TABLET, FILM COATED, EXTENDED RELEASE ORAL
Qty: 90 TABLET | Refills: 1 | Status: SHIPPED | OUTPATIENT
Start: 2020-12-15 | End: 2021-06-12

## 2020-12-15 NOTE — TELEPHONE ENCOUNTER
Routing refill request to provider for review/approval because:  Labs out of range:    Creatinine   Date Value Ref Range Status   01/15/2019 0.95 0.66 - 1.25 mg/dL Final     Lab Results   Component Value Date    ALT 63 05/15/2007     BP Readings from Last 3 Encounters:   08/26/19 (!) 152/92   06/19/19 139/83   04/15/19 (!) 137/94     Sarah Dumont, RN, BSN, PHN  [facility]: [place]

## 2021-02-03 DIAGNOSIS — M54.2 CERVICALGIA: ICD-10-CM

## 2021-02-03 DIAGNOSIS — M62.838 CERVICAL PARASPINAL MUSCLE SPASM: ICD-10-CM

## 2021-02-03 DIAGNOSIS — F41.0 PANIC ATTACK: ICD-10-CM

## 2021-02-03 DIAGNOSIS — M54.2 NECK PAIN: ICD-10-CM

## 2021-02-03 DIAGNOSIS — R51.9 NONINTRACTABLE EPISODIC HEADACHE, UNSPECIFIED HEADACHE TYPE: ICD-10-CM

## 2021-02-03 DIAGNOSIS — F07.81 POST CONCUSSION SYNDROME: ICD-10-CM

## 2021-02-04 ENCOUNTER — ANCILLARY PROCEDURE (OUTPATIENT)
Dept: GENERAL RADIOLOGY | Facility: CLINIC | Age: 56
End: 2021-02-04
Attending: PHYSICIAN ASSISTANT
Payer: COMMERCIAL

## 2021-02-04 ENCOUNTER — OFFICE VISIT (OUTPATIENT)
Dept: FAMILY MEDICINE | Facility: CLINIC | Age: 56
End: 2021-02-04
Payer: COMMERCIAL

## 2021-02-04 VITALS
OXYGEN SATURATION: 93 % | SYSTOLIC BLOOD PRESSURE: 152 MMHG | DIASTOLIC BLOOD PRESSURE: 98 MMHG | HEART RATE: 107 BPM | BODY MASS INDEX: 32.96 KG/M2 | WEIGHT: 243 LBS | TEMPERATURE: 98.9 F

## 2021-02-04 DIAGNOSIS — H93.11 TINNITUS, RIGHT: ICD-10-CM

## 2021-02-04 DIAGNOSIS — H92.09 OTALGIA, UNSPECIFIED LATERALITY: ICD-10-CM

## 2021-02-04 DIAGNOSIS — R42 VERTIGO: ICD-10-CM

## 2021-02-04 DIAGNOSIS — I10 HYPERTENSION GOAL BP (BLOOD PRESSURE) < 140/90: ICD-10-CM

## 2021-02-04 DIAGNOSIS — R09.81 SINUS CONGESTION: ICD-10-CM

## 2021-02-04 DIAGNOSIS — R05.9 COUGH: Primary | ICD-10-CM

## 2021-02-04 PROCEDURE — 99214 OFFICE O/P EST MOD 30 MIN: CPT | Performed by: PHYSICIAN ASSISTANT

## 2021-02-04 PROCEDURE — 71046 X-RAY EXAM CHEST 2 VIEWS: CPT | Mod: FY | Performed by: RADIOLOGY

## 2021-02-04 RX ORDER — METOPROLOL SUCCINATE 50 MG/1
50 TABLET, EXTENDED RELEASE ORAL DAILY
Qty: 90 TABLET | Refills: 1 | Status: SHIPPED | OUTPATIENT
Start: 2021-02-04 | End: 2021-07-02

## 2021-02-04 RX ORDER — ESCITALOPRAM OXALATE 5 MG/1
5 TABLET ORAL
COMMUNITY
Start: 2020-12-02 | End: 2021-11-22 | Stop reason: DRUGHIGH

## 2021-02-04 ASSESSMENT — PAIN SCALES - GENERAL: PAINLEVEL: MILD PAIN (3)
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
## 2021-02-04 NOTE — PATIENT INSTRUCTIONS
1. Check out [facility] for NAC (N-Acetyl Cysteine) 600 mg twice a day for mucus thinning (weirdly helps anxiety as well) - helps thin mucus, pretty safe  2. Add Metoprolol for blood pressure (also helps anxiety and sometimes migraines) -  Recheck in 1 month

## 2021-02-07 RX ORDER — CYCLOBENZAPRINE HCL 10 MG
10 TABLET ORAL 3 TIMES DAILY PRN
Qty: 90 TABLET | Refills: 3 | Status: SHIPPED | OUTPATIENT
Start: 2021-02-07 | End: 2021-07-21

## 2021-02-07 RX ORDER — NORTRIPTYLINE HYDROCHLORIDE 50 MG/1
CAPSULE ORAL
Qty: 90 CAPSULE | Refills: 3 | Status: SHIPPED | OUTPATIENT
Start: 2021-02-07 | End: 2022-02-07

## 2021-02-07 NOTE — TELEPHONE ENCOUNTER
Routing refill request to provider for review/approval because:  Drug not on the [facility] refill protocol       Valerie Dalton RN

## 2021-03-14 ENCOUNTER — HEALTH MAINTENANCE LETTER (OUTPATIENT)
Age: 56
End: 2021-03-14

## 2021-03-16 ENCOUNTER — IMMUNIZATION (OUTPATIENT)
Dept: NURSING | Facility: CLINIC | Age: 56
End: 2021-03-16
Payer: COMMERCIAL

## 2021-03-16 PROCEDURE — 0001A PR COVID VAC PFIZER DIL RECON 30 MCG/0.3 ML IM: CPT

## 2021-03-16 PROCEDURE — 91300 PR COVID VAC PFIZER DIL RECON 30 MCG/0.3 ML IM: CPT

## 2021-04-06 ENCOUNTER — IMMUNIZATION (OUTPATIENT)
Dept: NURSING | Facility: CLINIC | Age: 56
End: 2021-04-06
Attending: FAMILY MEDICINE
Payer: COMMERCIAL

## 2021-04-06 PROCEDURE — 0002A PR COVID VAC PFIZER DIL RECON 30 MCG/0.3 ML IM: CPT

## 2021-04-06 PROCEDURE — 91300 PR COVID VAC PFIZER DIL RECON 30 MCG/0.3 ML IM: CPT

## 2021-04-19 DIAGNOSIS — S06.9X0S TRAUMATIC BRAIN INJURY, WITHOUT LOSS OF CONSCIOUSNESS, SEQUELA (H): ICD-10-CM

## 2021-04-20 RX ORDER — VERAPAMIL HYDROCHLORIDE 240 MG/1
TABLET, FILM COATED, EXTENDED RELEASE ORAL
Qty: 90 TABLET | Refills: 0 | OUTPATIENT
Start: 2021-04-20

## 2021-06-10 DIAGNOSIS — S06.9X0S TRAUMATIC BRAIN INJURY, WITHOUT LOSS OF CONSCIOUSNESS, SEQUELA (H): ICD-10-CM

## 2021-06-11 NOTE — TELEPHONE ENCOUNTER
Routing refill request to provider for review/approval because:  Labs not current:    Lab Results   Component Value Date    ALT 63 05/15/2007     Creatinine   Date Value Ref Range Status   01/15/2019 0.95 0.66 - 1.25 mg/dL Final     BP under 140/90 in past 12 months    Allie Tian RN

## 2021-06-12 RX ORDER — VERAPAMIL HYDROCHLORIDE 240 MG/1
TABLET, FILM COATED, EXTENDED RELEASE ORAL
Qty: 90 TABLET | Refills: 0 | Status: SHIPPED | OUTPATIENT
Start: 2021-06-12 | End: 2021-09-13

## 2021-07-02 ENCOUNTER — OFFICE VISIT (OUTPATIENT)
Dept: FAMILY MEDICINE | Facility: CLINIC | Age: 56
End: 2021-07-02
Payer: COMMERCIAL

## 2021-07-02 VITALS
HEIGHT: 72 IN | OXYGEN SATURATION: 96 % | SYSTOLIC BLOOD PRESSURE: 156 MMHG | WEIGHT: 244.2 LBS | DIASTOLIC BLOOD PRESSURE: 86 MMHG | TEMPERATURE: 99.8 F | HEART RATE: 88 BPM | BODY MASS INDEX: 33.08 KG/M2

## 2021-07-02 DIAGNOSIS — S06.9X0S TRAUMATIC BRAIN INJURY, WITHOUT LOSS OF CONSCIOUSNESS, SEQUELA (H): ICD-10-CM

## 2021-07-02 DIAGNOSIS — I10 HYPERTENSION GOAL BP (BLOOD PRESSURE) < 140/90: ICD-10-CM

## 2021-07-02 DIAGNOSIS — F32.0 MILD MAJOR DEPRESSION (H): ICD-10-CM

## 2021-07-02 DIAGNOSIS — L21.9 SEBORRHEIC DERMATITIS: ICD-10-CM

## 2021-07-02 DIAGNOSIS — R06.83 SNORING: Primary | ICD-10-CM

## 2021-07-02 PROCEDURE — 90715 TDAP VACCINE 7 YRS/> IM: CPT | Performed by: PHYSICIAN ASSISTANT

## 2021-07-02 PROCEDURE — 99214 OFFICE O/P EST MOD 30 MIN: CPT | Mod: 25 | Performed by: PHYSICIAN ASSISTANT

## 2021-07-02 PROCEDURE — 90471 IMMUNIZATION ADMIN: CPT | Performed by: PHYSICIAN ASSISTANT

## 2021-07-02 RX ORDER — METOPROLOL SUCCINATE 100 MG/1
100 TABLET, EXTENDED RELEASE ORAL DAILY
Qty: 90 TABLET | Refills: 1 | Status: SHIPPED | OUTPATIENT
Start: 2021-07-02 | End: 2021-11-23

## 2021-07-02 RX ORDER — KETOCONAZOLE 20 MG/G
CREAM TOPICAL
Qty: 30 G | Refills: 1 | Status: SHIPPED | OUTPATIENT
Start: 2021-07-02

## 2021-07-02 ASSESSMENT — MIFFLIN-ST. JEOR: SCORE: 1975.68
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
## 2021-07-21 DIAGNOSIS — M54.2 CERVICALGIA: ICD-10-CM

## 2021-07-21 DIAGNOSIS — M62.838 CERVICAL PARASPINAL MUSCLE SPASM: ICD-10-CM

## 2021-07-21 RX ORDER — CYCLOBENZAPRINE HCL 10 MG
10 TABLET ORAL 3 TIMES DAILY PRN
Qty: 90 TABLET | Refills: 5 | Status: SHIPPED | OUTPATIENT
Start: 2021-07-21 | End: 2022-02-07

## 2021-07-21 NOTE — TELEPHONE ENCOUNTER
Routing refill request to provider for review/approval because:  Drug not on the [facility] refill protocol     Allie Tian RN

## 2021-08-17 ENCOUNTER — OFFICE VISIT (OUTPATIENT)
Dept: OTOLARYNGOLOGY | Facility: CLINIC | Age: 56
End: 2021-08-17
Payer: COMMERCIAL

## 2021-08-17 VITALS
HEART RATE: 85 BPM | WEIGHT: 244 LBS | OXYGEN SATURATION: 95 % | RESPIRATION RATE: 16 BRPM | BODY MASS INDEX: 33.09 KG/M2 | DIASTOLIC BLOOD PRESSURE: 100 MMHG | SYSTOLIC BLOOD PRESSURE: 161 MMHG

## 2021-08-17 DIAGNOSIS — H93.11 TINNITUS, RIGHT: ICD-10-CM

## 2021-08-17 DIAGNOSIS — H61.22 IMPACTED CERUMEN OF LEFT EAR: ICD-10-CM

## 2021-08-17 DIAGNOSIS — H81.01 MENIERE'S DISEASE, RIGHT: Primary | ICD-10-CM

## 2021-08-17 PROBLEM — B18.2 HEPATITIS C, CHRONIC (H): Status: ACTIVE | Noted: 2021-08-17

## 2021-08-17 PROCEDURE — 69210 REMOVE IMPACTED EAR WAX UNI: CPT | Performed by: OTOLARYNGOLOGY

## 2021-08-17 PROCEDURE — 99243 OFF/OP CNSLTJ NEW/EST LOW 30: CPT | Mod: 25 | Performed by: OTOLARYNGOLOGY

## 2021-08-17 RX ORDER — MECLIZINE HYDROCHLORIDE 25 MG/1
25 TABLET ORAL 3 TIMES DAILY PRN
Qty: 30 TABLET | Refills: 3 | Status: SHIPPED | OUTPATIENT
Start: 2021-08-17

## 2021-08-17 RX ORDER — ONDANSETRON 4 MG/1
4 TABLET, ORALLY DISINTEGRATING ORAL EVERY 8 HOURS PRN
Qty: 20 TABLET | Refills: 3 | Status: SHIPPED | OUTPATIENT
Start: 2021-08-17 | End: 2023-07-19
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
## 2021-08-25 PROBLEM — F32.0 MILD MAJOR DEPRESSION (H): Chronic | Status: ACTIVE | Noted: 2018-06-14

## 2021-08-25 PROBLEM — Z87.820 HISTORY OF TRAUMATIC BRAIN INJURY: Chronic | Status: ACTIVE | Noted: 2017-02-16

## 2021-08-25 PROBLEM — F41.1 GAD (GENERALIZED ANXIETY DISORDER): Chronic | Status: ACTIVE | Noted: 2021-08-25
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
## 2021-08-25 PROBLEM — Z87.820 HISTORY OF TRAUMATIC BRAIN INJURY: Status: ACTIVE | Noted: 2017-02-16
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
## 2021-08-25 PROBLEM — F41.9 ANXIETY DISORDER: Status: ACTIVE | Noted: 2021-08-25

## 2021-08-25 PROBLEM — J30.2 SEASONAL ALLERGIES: Status: ACTIVE | Noted: 2021-08-25

## 2021-08-25 PROBLEM — F07.81 POST CONCUSSION SYNDROME: Status: RESOLVED | Noted: 2017-04-24 | Resolved: 2021-08-25

## 2021-08-26 ENCOUNTER — OFFICE VISIT (OUTPATIENT)
Dept: SLEEP MEDICINE | Facility: CLINIC | Age: 56
End: 2021-08-26
Attending: PHYSICIAN ASSISTANT
Payer: COMMERCIAL

## 2021-08-26 VITALS — WEIGHT: 244 LBS | HEIGHT: 72 IN | BODY MASS INDEX: 33.05 KG/M2

## 2021-08-26 DIAGNOSIS — I10 ESSENTIAL HYPERTENSION: Primary | Chronic | ICD-10-CM

## 2021-08-26 DIAGNOSIS — E66.811 CLASS 1 OBESITY DUE TO EXCESS CALORIES WITHOUT SERIOUS COMORBIDITY WITH BODY MASS INDEX (BMI) OF 33.0 TO 33.9 IN ADULT: Chronic | ICD-10-CM

## 2021-08-26 DIAGNOSIS — R06.83 SNORING: ICD-10-CM

## 2021-08-26 DIAGNOSIS — R06.89 DYSPNEA AND RESPIRATORY ABNORMALITY: ICD-10-CM

## 2021-08-26 DIAGNOSIS — E66.09 CLASS 1 OBESITY DUE TO EXCESS CALORIES WITHOUT SERIOUS COMORBIDITY WITH BODY MASS INDEX (BMI) OF 33.0 TO 33.9 IN ADULT: Chronic | ICD-10-CM

## 2021-08-26 DIAGNOSIS — R06.00 DYSPNEA AND RESPIRATORY ABNORMALITY: ICD-10-CM

## 2021-08-26 DIAGNOSIS — F51.04 CHRONIC INSOMNIA: ICD-10-CM

## 2021-08-26 DIAGNOSIS — R53.81 MALAISE AND FATIGUE: ICD-10-CM

## 2021-08-26 DIAGNOSIS — Z87.820 HISTORY OF TRAUMATIC BRAIN INJURY: Chronic | ICD-10-CM

## 2021-08-26 DIAGNOSIS — G47.9 DISTURBANCE IN SLEEP BEHAVIOR: ICD-10-CM

## 2021-08-26 DIAGNOSIS — R53.83 MALAISE AND FATIGUE: ICD-10-CM

## 2021-08-26 DIAGNOSIS — G47.10 ORGANIC HYPERSOMNIA: ICD-10-CM

## 2021-08-26 PROBLEM — G89.29 CHRONIC LOW BACK PAIN: Chronic | Status: ACTIVE | Noted: 2021-08-26

## 2021-08-26 PROBLEM — F41.9 ANXIETY DISORDER: Chronic | Status: ACTIVE | Noted: 2021-08-25

## 2021-08-26 PROBLEM — R51.9 NONINTRACTABLE EPISODIC HEADACHE, UNSPECIFIED HEADACHE TYPE: Chronic | Status: ACTIVE | Noted: 2017-02-16

## 2021-08-26 PROBLEM — M54.50 CHRONIC LOW BACK PAIN: Chronic | Status: ACTIVE | Noted: 2021-08-26

## 2021-08-26 PROCEDURE — 99205 OFFICE O/P NEW HI 60 MIN: CPT | Performed by: INTERNAL MEDICINE

## 2021-08-26 RX ORDER — ZOLPIDEM TARTRATE 5 MG/1
TABLET ORAL
Qty: 1 TABLET | Refills: 0 | Status: SHIPPED | OUTPATIENT
Start: 2021-08-26 | End: 2022-02-07

## 2021-08-26 ASSESSMENT — MIFFLIN-ST. JEOR: SCORE: 1974.78
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
## 2021-08-27 NOTE — PROGRESS NOTES
Shon has been scheduled for in lab sleep study at  () sleep center on 10/17/21    Return visit with Dr. Sanchez has been scheduled on 11/22/21 for study results.   *added to wait list to try to get sooner appointment.     () Sleep study packet has been mailed today.       Essence HARVEY CMA, SLEEP MEDICINE, 8/27/2021 10:08 AM

## 2021-09-13 DIAGNOSIS — S06.9X0S TRAUMATIC BRAIN INJURY, WITHOUT LOSS OF CONSCIOUSNESS, SEQUELA (H): ICD-10-CM

## 2021-09-14 RX ORDER — VERAPAMIL HYDROCHLORIDE 240 MG/1
240 TABLET, FILM COATED, EXTENDED RELEASE ORAL DAILY
Qty: 90 TABLET | Refills: 1 | Status: SHIPPED | OUTPATIENT
Start: 2021-09-14 | End: 2022-02-07

## 2021-09-14 NOTE — TELEPHONE ENCOUNTER
Routing refill request to provider for review/approval because:  Labs not current:  Alt, and serum creatinine  Recheck BP    Pending Prescriptions:                       Disp   Refills    verapamil ER (CALAN-SR) 240 MG CR tablet   90 tab*0        Sig: Take 1 tablet (240 mg) by mouth daily      Nanci Nuñez RN

## 2021-10-01 ENCOUNTER — OFFICE VISIT (OUTPATIENT)
Dept: FAMILY MEDICINE | Facility: CLINIC | Age: 56
End: 2021-10-01
Payer: COMMERCIAL

## 2021-10-01 VITALS
WEIGHT: 238 LBS | OXYGEN SATURATION: 98 % | DIASTOLIC BLOOD PRESSURE: 82 MMHG | TEMPERATURE: 97.9 F | BODY MASS INDEX: 32.23 KG/M2 | HEART RATE: 87 BPM | SYSTOLIC BLOOD PRESSURE: 127 MMHG | HEIGHT: 72 IN

## 2021-10-01 DIAGNOSIS — N63.10 BREAST MASS, RIGHT: Primary | ICD-10-CM

## 2021-10-01 DIAGNOSIS — Z23 NEED FOR PROPHYLACTIC VACCINATION AND INOCULATION AGAINST INFLUENZA: ICD-10-CM

## 2021-10-01 PROCEDURE — 90682 RIV4 VACC RECOMBINANT DNA IM: CPT | Performed by: PHYSICIAN ASSISTANT

## 2021-10-01 PROCEDURE — 99213 OFFICE O/P EST LOW 20 MIN: CPT | Mod: 25 | Performed by: PHYSICIAN ASSISTANT

## 2021-10-01 PROCEDURE — 90471 IMMUNIZATION ADMIN: CPT | Performed by: PHYSICIAN ASSISTANT

## 2021-10-01 ASSESSMENT — MIFFLIN-ST. JEOR: SCORE: 1947.56

## 2021-10-01 ASSESSMENT — PATIENT HEALTH QUESTIONNAIRE - PHQ9: SUM OF ALL RESPONSES TO PHQ QUESTIONS 1-9: 7

## 2021-10-01 NOTE — LETTER
My Depression Action Plan  Name: Shon Bailey   Date of Birth 1965  Date: 10/1/2021    My doctor: Isaiah Islas   My clinic: [facility]  [address]  [place] [address]  [phone]          GREEN    ZONE   Good Control    What it looks like:     Things are going generally well. You have normal ups and downs. You may even feel depressed from time to time, but bad moods usually last less than a day.   What you need to do:  1. Continue to care for yourself (see self care plan)  2. Check your depression survival kit and update it as needed  3. Follow your physician s recommendations including any medication.  4. Do not stop taking medication unless you consult with your physician first.           YELLOW         ZONE Getting Worse    What it looks like:     Depression is starting to interfere with your life.     It may be hard to get out of bed; you may be starting to isolate yourself from others.    Symptoms of depression are starting to last most all day and this has happened for several days.     You may have suicidal thoughts but they are not constant.   What you need to do:     1. Call your care team. Your response to treatment will improve if you keep your care team informed of your progress. Yellow periods are signs an adjustment may need to be made.     2. Continue your self-care.  Just get dressed and ready for the day.  Don't give yourself time to talk yourself out of it.    3. Talk to someone in your support network.    4. Open up your Depression Self-Care Plan/Wellness Kit.           RED    ZONE Medical Alert - Get Help    What it looks like:     Depression is seriously interfering with your life.     You may experience these or other symptoms: You can t get out of bed most days, can t work or engage in other necessary activities, you have trouble taking care of basic hygiene, or basic responsibilities, thoughts of suicide or death that will not go  away, self-injurious behavior.     What you need to do:  1. Call your care team and request a same-day appointment. If they are not available (weekends or after hours) call your local crisis line, emergency room or 911.          Depression Self-Care Plan / Wellness Kit    Many people find that medication and therapy are helpful treatments for managing depression. In addition, making small changes to your everyday life can help to boost your mood and improve your wellbeing. Below are some tips for you to consider. Be sure to talk with your medical provider and/or behavioral health consultant if your symptoms are worsening or not improving.     Sleep   Sleep hygiene  means all of the habits that support good, restful sleep. It includes maintaining a consistent bedtime and wake time, using your bedroom only for sleeping or sex, and keeping the bedroom dark and free of distractions like a computer, smartphone, or television.     Develop a Healthy Routine  Maintain good hygiene. Get out of bed in the morning, make your bed, brush your teeth, take a shower, and get dressed. Don t spend too much time viewing media that makes you feel stressed. Find time to relax each day.    Exercise  Get some form of exercise every day. This will help reduce pain and release endorphins, the  feel good  chemicals in your brain. It can be as simple as just going for a walk or doing some gardening, anything that will get you moving.      Diet  Strive to eat healthy foods, including fruits and vegetables. Drink plenty of water. Avoid excessive sugar, caffeine, alcohol, and other mood-altering substances.     Stay Connected with Others  Stay in touch with friends and family members.    Manage Your Mood  Try deep breathing, massage therapy, biofeedback, or meditation. Take part in fun activities when you can. Try to find something to smile about each day.     Psychotherapy  Be open to working with a therapist if your provider recommends it.      Medication  Be sure to take your medication as prescribed. Most anti-depressants need to be taken every day. It usually takes several weeks for medications to work. Not all medicines work for all people. It is important to follow-up with your provider to make sure you have a treatment plan that is working for you. Do not stop your medication abruptly without first discussing it with your provider.    Crisis Resources   These hotlines are for both adults and children. They and are open 24 hours a day, 7 days a week unless noted otherwise.      National Suicide Prevention Lifeline   2-377-063-TALK (0772)      Crisis Text Line    www.crisistextline.org  Text HOME to 944631 from anywhere in the [place], anytime, about any type of crisis. A live, trained crisis counselor will receive the text and respond quickly.      [place] Lifeline for LGBTQ Youth  A national crisis intervention and suicide lifeline for LGBTQ youth under 25. Provides a safe place to talk without judgement. Call 1-492.307.7621; text START to 326826 or visit www.thetrevorproject.org to talk to a trained counselor.      For [facility] crisis numbers, visit the [facility] website at:  https://[place].gov/dhs/people-we-serve/adults/health-care/mental-health/resources/crisis-contacts.jsp

## 2021-10-01 NOTE — PROGRESS NOTES
Assessment & Plan     Breast mass, right  - MA Diagnostic Digital Bilateral; Future  - US Breast Right Complete 4 Quadrants; Future    Need for prophylactic vaccination and inoculation against influenza  - INFLUENZA QUAD, RECOMBINANT, P-FREE (RIV4) (FLUBLOK)        Return in about 1 week (around 10/8/2021) for Mammogram and US.    MILAGRO [facility] [place] is a 56 year old who presents for the following health issues  accompanied by his self:    HPI     Breast Concern  Onset/Duration: 1-2 months  Description:   Location: Right breast, off the nipple to the right   Pain or tenderness: YES- pain and tenderness   Redness: no  Intensity: mild  Progression of Symptoms: worsening  Accompanying Signs & Symptoms:  Any lumps in axillary region: no  Movable: no  Nipple discharge: none  Changes in the skin or nipple: none  On Hormone therapy: no  Does it change with menstrual cycle: not applicable  Previous history of similar problem: none  First degree relative with breast cancer: a positive family history for breast cancer in her mother.  Precipitating factors:           Worsened by: when pressing down on it   Alleviating factors:            Improved by: n/a  Therapies tried and outcome: None  No LMP for male patient.    Never had this before.      Review of Systems   Constitutional, HEENT, cardiovascular, pulmonary, gi and gu systems are negative, except as otherwise noted.      Objective    /82   Pulse 87   Temp 97.9  F (36.6  C) (Oral)   Ht 1.829 m (6')   Wt 108 kg (238 lb)   SpO2 98%   BMI 32.28 kg/m    Body mass index is 32.28 kg/m .     Physical Exam   GENERAL: healthy, alert and no distress  BREAST: normal without masses, tenderness or nipple discharge, no palpable axillary masses or adenopathy, mass right breast lateral to nipple ~ 1.5 cm ropy and tenderness right of the same

## 2021-10-12 ENCOUNTER — OFFICE VISIT (OUTPATIENT)
Dept: OTOLARYNGOLOGY | Facility: CLINIC | Age: 56
End: 2021-10-12
Payer: COMMERCIAL

## 2021-10-12 ENCOUNTER — OFFICE VISIT (OUTPATIENT)
Dept: AUDIOLOGY | Facility: CLINIC | Age: 56
End: 2021-10-12
Payer: COMMERCIAL

## 2021-10-12 DIAGNOSIS — R42 DIZZINESS: ICD-10-CM

## 2021-10-12 DIAGNOSIS — H90.3 SENSORINEURAL HEARING LOSS (SNHL) OF BOTH EARS: Primary | ICD-10-CM

## 2021-10-12 DIAGNOSIS — H81.01 MENIERE'S DISEASE, RIGHT: ICD-10-CM

## 2021-10-12 DIAGNOSIS — H90.3 SENSORINEURAL HEARING LOSS, BILATERAL: Primary | ICD-10-CM

## 2021-10-12 PROCEDURE — 99213 OFFICE O/P EST LOW 20 MIN: CPT | Performed by: OTOLARYNGOLOGY

## 2021-10-12 PROCEDURE — 92557 COMPREHENSIVE HEARING TEST: CPT | Performed by: AUDIOLOGIST

## 2021-10-12 PROCEDURE — 99207 PR NO CHARGE LOS: CPT | Performed by: AUDIOLOGIST

## 2021-10-12 PROCEDURE — 92550 TYMPANOMETRY & REFLEX THRESH: CPT | Performed by: AUDIOLOGIST

## 2021-10-12 NOTE — PROGRESS NOTES
AUDIOLOGY REPORT:    Patient was referred from ENT by Dr. Pozo for audiology evaluation. The patient reports that he had an ear infection with bleeding from the left ear around 2-3 months ago. He also reports bilateral tinnitus since a TBI in 2016. The patient reports episodes of dizziness with 1-1.5 hours of intense dizziness with nausea and vomiting. He reports that he tries to sleep when this happens and then it will be gone in 3-4 hours. He reports increased tinnitus during attacks of dizziness. He has also noted decreased hearing in the right ear. The patient reports a history of ear infections, but has not had ear surgery. 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 He notes a family history of hearing loss with age.    Testing:    Otoscopy:   Otoscopic exam indicates ears are clear of cerumen bilaterally     Tympanograms:    RIGHT: normal eardrum mobility     LEFT:   normal eardrum mobility    Reflexes (reported by stimulus ear):  RIGHT: Ipsilateral is present at normal levels  RIGHT: Contralateral is present at normal levels  LEFT:   Ipsilateral is present at normal levels  LEFT:   Contralateral is present at normal levels    Thresholds:   Pure Tone Thresholds assessed using conventional audiometry with good reliability from 250-8000 Hz bilaterally using insert earphones and circumaural headphones     RIGHT:  normal hearing sensitivity through 2000 Hz sloping to mild to moderate sensorineural hearing loss    LEFT:    normal hearing sensitivity through 2000 Hz sloping to mild to moderate sensorineural hearing loss    Speech Reception Threshold:    RIGHT: 15 dB HL    LEFT:   10 dB HL  Results are in agreement with pure tone average.     Word Recognition Score:     RIGHT: 96% at 60 dB HL using NU-6 recorded word list.    LEFT:   100% at 60 dB HL using NU-6 recorded word list.    Discussed results with the patient.     Patient was returned to ENT for follow up.     Laura Varma,  CCC-A  Licensed Audiologist #[id number]  10/12/2021

## 2021-10-12 NOTE — PROGRESS NOTES
Chief Complaint - recheck Meniere's disease    History of Present Illness - Shon Bailey is a 56 year old male who returns to me today to recheck meniere's disease. he has long-standing tinnitus from a TBI. He describes past infections. In the past 2-3 years he has been feeling he has Meniere's disease. He describes dizziness with movement and vomiting. It lasts for an hour. It has happened about 12 times in the last 2 years, but not in the last 8 weeks since I last saw him. He occasional gets pressure or fullness in ears, more on the left. He has some hearing loss, but no change recently.  He has developed anxiety and depression since TBI (2016).     Past Medical History -   Patient Active Problem List   Diagnosis     CARDIOVASCULAR SCREENING; LDL GOAL LESS THAN 160     Thrombosed external hemorrhoids     Chronic neck pain     Nonintractable episodic headache, unspecified headache type     History of traumatic brain injury     Convergence insufficiency or palsy in binocular eye movement     Essential hypertension     Seasonal allergies     Anxiety disorder     Class 1 obesity due to excess calories without serious comorbidity with body mass index (BMI) of 33.0 to 33.9 in adult     Chronic low back pain     Chronic insomnia       Current Medications -   Current Outpatient Medications:      ALPRAZolam (XANAX) 0.5 MG tablet, TAKE ONE-HALF TO ONE TABLET BY MOUTH AS NEEDED FOR PANIC ATTACKS, Disp: 20 tablet, Rfl: 0     cyclobenzaprine (FLEXERIL) 10 MG tablet, Take 1 tablet (10 mg) by mouth 3 times daily as needed for muscle spasms, Disp: 90 tablet, Rfl: 5     divalproex sodium extended-release (DEPAKOTE ER) 500 MG 24 hr tablet, Take 500 mg by mouth daily, Disp: , Rfl:      escitalopram (LEXAPRO) 5 MG tablet, Take 5 mg by mouth, Disp: , Rfl:      ketoconazole (NIZORAL) 2 % external cream, Apply thin layer to affected areas daily, Disp: 30 g, Rfl: 1     LORATADINE 10 MG OR TABS, ONE DAILY, Disp: 30, Rfl: 3     MAGNESIUM  PO, Pt uses a supplement as needed. When they have the medication on hand, they use it daily., Disp: , Rfl:      meclizine (ANTIVERT) 25 MG tablet, Take 1 tablet (25 mg) by mouth 3 times daily as needed for dizziness, Disp: 30 tablet, Rfl: 3     metoprolol succinate ER (TOPROL-XL) 100 MG 24 hr tablet, Take 1 tablet (100 mg) by mouth daily, Disp: 90 tablet, Rfl: 1     MULTI-VITAMIN PO TABS, 1 TABLET DAILY, Disp: , Rfl:      nortriptyline (PAMELOR) 50 MG capsule, TAKE 1 CAPSULE BY MOUTH ONCE DAILY AT BEDTIME, Disp: 90 capsule, Rfl: 3     ondansetron (ZOFRAN-ODT) 4 MG ODT tab, Take 1 tablet (4 mg) by mouth every 8 hours as needed for nausea, Disp: 20 tablet, Rfl: 3     verapamil ER (CALAN-SR) 240 MG CR tablet, Take 1 tablet (240 mg) by mouth daily, Disp: 90 tablet, Rfl: 1     VITAMIN D PO, 5000 IU daily, Disp: , Rfl:      zolpidem (AMBIEN) 5 MG tablet, Take tablet by mouth 15 minutes prior to sleep, for Sleep Study, Disp: 1 tablet, Rfl: 0    Allergies -   Allergies   Allergen Reactions     Zonisamide        Social History -   Social History     Socioeconomic History     Marital status:      Spouse name: Not on file     Number of children: Not on file     Years of education: Not on file     Highest education level: Not on file   Occupational History     Employer: [employer]      Comment: personnel mgr- 9 years   Tobacco Use     Smoking status: Passive Smoke Exposure - Never Smoker     Smokeless tobacco: Never Used     Tobacco comment: Pt is not a current user.    Substance and Sexual Activity     Alcohol use: Yes     Alcohol/week: 0.0 standard drinks     Comment: occasional     Drug use: Yes     Comment: Marijuana in the past     Sexual activity: Yes     Partners: Female     Birth control/protection: Condom   Other Topics Concern     Parent/sibling w/ CABG, MI or angioplasty before 65F 55M? No   Social History Narrative     Not on file     Social Determinants of Health     Financial Resource  Strain:      Difficulty of Paying Living Expenses:    Food Insecurity:      Worried About Running Out of Food in the Last Year:      Ran Out of Food in the Last Year:    Transportation Needs:      Lack of Transportation (Medical):      Lack of Transportation (Non-Medical):    Physical Activity:      Days of Exercise per Week:      Minutes of Exercise per Session:    Stress:      Feeling of Stress :    Social Connections:      Frequency of Communication with Friends and Family:      Frequency of Social Gatherings with Friends and Family:      Attends [employer] Services:      Active Member of Clubs or Organizations:      Attends Club or Organization Meetings:      Marital Status:    Intimate Partner Violence:      Fear of Current or Ex-Partner:      Emotionally Abused:      Physically Abused:      Sexually Abused:        Family History -   Family History   Problem Relation Age of Onset     Coronary Artery Disease Father      C.A.D. Father         heart attack, 44 y/o     Coronary Artery Disease Brother      Diabetes No family hx of        Physical Exam  General - The patient is in no distress.  Alert and oriented to person and place, answers questions and cooperates with examination appropriately.   Voice and Breathing - The patient was breathing comfortably without the use of accessory muscles. There was no wheezing, stridor, or stertor.  The patients voice was clear and strong.  Ears - both canals were clean and clear.  I see no evidence of erythema or edema.  No infection.  Both tympanic membranes intact.  No effusion and no middle ear infection.  Eyes - Extraocular movements intact.  Sclera were not icteric or injected.  Neck - soft, non-tender. palpation of the occipital, submental, submandibular, internal jugular chain, and supraclavicular nodes did not demonstrate any abnormal lymph nodes or masses. No parotid masses. Palpation of the thyroid was soft and smooth, with no nodules or goiter appreciated.  The  trachea was mobile and midline.  Neurological - Cranial nerves 2 through 12 were grossly intact. House-Brackmann grade 1 out of 6 bilaterally.    Audiogram -bilateral type a tympanograms.  Bilateral downsloping sensorineural hearing loss.    Assessment and Plan - Shon Bailey is a 56 year old male with likely right-sided Ménière's disease.  He notes about 10 or more episodes of vertigo lasting 1 or more hours over the past 2 years.  With this he gets very ill with vomiting and an inability to move secondary to severe vertigo.  He also notes worsening right tinnitus. We discussed Ménière's and the typical presentations and courses.  Fortunately he has not had any episodes since her last visit 2 months ago.  He does have bilateral downsloping sensorineural hearing loss but no low-frequency hearing loss.  Hopefully things continue along this path.  If things worsen again he should return.  Otherwise recheck hearing in 1 year.  He can consider hearing aids but he deferred at this time.    Martin Pozo MD  Otolaryngology  [facility]

## 2021-10-12 NOTE — LETTER
10/12/2021         RE: Shon Bailey  [address] [id number]        Dear Colleague,    Thank you for referring your patient, Shon Bailey, to the [facility]. Please see a copy of my visit note below.    Chief Complaint - recheck Meniere's disease    History of Present Illness - Shon Bailey is a 56 year old male who returns to me today to recheck meniere's disease. he has long-standing tinnitus from a TBI. He describes past infections. In the past 2-3 years he has been feeling he has Meniere's disease. He describes dizziness with movement and vomiting. It lasts for an hour. It has happened about 12 times in the last 2 years, but not in the last 8 weeks since I last saw him. He occasional gets pressure or fullness in ears, more on the left. He has some hearing loss, but no change recently.  He has developed anxiety and depression since TBI (2016).     Past Medical History -   Patient Active Problem List   Diagnosis     CARDIOVASCULAR SCREENING; LDL GOAL LESS THAN 160     Thrombosed external hemorrhoids     Chronic neck pain     Nonintractable episodic headache, unspecified headache type     History of traumatic brain injury     Convergence insufficiency or palsy in binocular eye movement     Essential hypertension     Seasonal allergies     Anxiety disorder     Class 1 obesity due to excess calories without serious comorbidity with body mass index (BMI) of 33.0 to 33.9 in adult     Chronic low back pain     Chronic insomnia       Current Medications -   Current Outpatient Medications:      ALPRAZolam (XANAX) 0.5 MG tablet, TAKE ONE-HALF TO ONE TABLET BY MOUTH AS NEEDED FOR PANIC ATTACKS, Disp: 20 tablet, Rfl: 0     cyclobenzaprine (FLEXERIL) 10 MG tablet, Take 1 tablet (10 mg) by mouth 3 times daily as needed for muscle spasms, Disp: 90 tablet, Rfl: 5     divalproex sodium extended-release (DEPAKOTE ER) 500 MG 24 hr tablet, Take 500 mg by mouth daily, Disp: , Rfl:       escitalopram (LEXAPRO) 5 MG tablet, Take 5 mg by mouth, Disp: , Rfl:      ketoconazole (NIZORAL) 2 % external cream, Apply thin layer to affected areas daily, Disp: 30 g, Rfl: 1     LORATADINE 10 MG OR TABS, ONE DAILY, Disp: 30, Rfl: 3     MAGNESIUM PO, Pt uses a supplement as needed. When they have the medication on hand, they use it daily., Disp: , Rfl:      meclizine (ANTIVERT) 25 MG tablet, Take 1 tablet (25 mg) by mouth 3 times daily as needed for dizziness, Disp: 30 tablet, Rfl: 3     metoprolol succinate ER (TOPROL-XL) 100 MG 24 hr tablet, Take 1 tablet (100 mg) by mouth daily, Disp: 90 tablet, Rfl: 1     MULTI-VITAMIN PO TABS, 1 TABLET DAILY, Disp: , Rfl:      nortriptyline (PAMELOR) 50 MG capsule, TAKE 1 CAPSULE BY MOUTH ONCE DAILY AT BEDTIME, Disp: 90 capsule, Rfl: 3     ondansetron (ZOFRAN-ODT) 4 MG ODT tab, Take 1 tablet (4 mg) by mouth every 8 hours as needed for nausea, Disp: 20 tablet, Rfl: 3     verapamil ER (CALAN-SR) 240 MG CR tablet, Take 1 tablet (240 mg) by mouth daily, Disp: 90 tablet, Rfl: 1     VITAMIN D PO, 5000 IU daily, Disp: , Rfl:      zolpidem (AMBIEN) 5 MG tablet, Take tablet by mouth 15 minutes prior to sleep, for Sleep Study, Disp: 1 tablet, Rfl: 0    Allergies -   Allergies   Allergen Reactions     Zonisamide        Social History -   Social History     Socioeconomic History     Marital status:      Spouse name: Not on file     Number of children: Not on file     Years of education: Not on file     Highest education level: Not on file   Occupational History     Employer: [employer]     Comment: personnel mgr- 9 years   Tobacco Use     Smoking status: Passive Smoke Exposure - Never Smoker     Smokeless tobacco: Never Used     Tobacco comment: Pt is not a current user.    Substance and Sexual Activity     Alcohol use: Yes     Alcohol/week: 0.0 standard drinks     Comment: occasional     Drug use: Yes     Comment: Marijuana in the past     Sexual activity:  Yes     Partners: Female     Birth control/protection: Condom   Other Topics Concern     Parent/sibling w/ CABG, MI or angioplasty before 65F 55M? No   Social History Narrative     Not on file     Social Determinants of Health     Financial Resource Strain:      Difficulty of Paying Living Expenses:    Food Insecurity:      Worried About Running Out of Food in the Last Year:      Ran Out of Food in the Last Year:    Transportation Needs:      Lack of Transportation (Medical):      Lack of Transportation (Non-Medical):    Physical Activity:      Days of Exercise per Week:      Minutes of Exercise per Session:    Stress:      Feeling of Stress :    Social Connections:      Frequency of Communication with Friends and Family:      Frequency of Social Gatherings with Friends and Family:      Attends Religion Services:      Active Member of Clubs or Organizations:      Attends Club or Organization Meetings:      Marital Status:    Intimate Partner Violence:      Fear of Current or Ex-Partner:      Emotionally Abused:      Physically Abused:      Sexually Abused:        Family History -   Family History   Problem Relation Age of Onset     Coronary Artery Disease Father      C.A.D. Father         heart attack, 44 y/o     Coronary Artery Disease Brother      Diabetes No family hx of        Physical Exam  General - The patient is in no distress.  Alert and oriented to person and place, answers questions and cooperates with examination appropriately.   Voice and Breathing - The patient was breathing comfortably without the use of accessory muscles. There was no wheezing, stridor, or stertor.  The patients voice was clear and strong.  Ears - both canals were clean and clear.  I see no evidence of erythema or edema.  No infection.  Both tympanic membranes intact.  No effusion and no middle ear infection.  Eyes - Extraocular movements intact.  Sclera were not icteric or injected.  Neck - soft, non-tender. palpation of the  occipital, submental, submandibular, internal jugular chain, and supraclavicular nodes did not demonstrate any abnormal lymph nodes or masses. No parotid masses. Palpation of the thyroid was soft and smooth, with no nodules or goiter appreciated.  The trachea was mobile and midline.  Neurological - Cranial nerves 2 through 12 were grossly intact. House-Brackmann grade 1 out of 6 bilaterally.    Audiogram -bilateral type a tympanograms.  Bilateral downsloping sensorineural hearing loss.    Assessment and Plan - Shon Bailey is a 56 year old male with likely right-sided Ménière's disease.  He notes about 10 or more episodes of vertigo lasting 1 or more hours over the past 2 years.  With this he gets very ill with vomiting and an inability to move secondary to severe vertigo.  He also notes worsening right tinnitus. We discussed Ménière's and the typical presentations and courses.  Fortunately he has not had any episodes since her last visit 2 months ago.  He does have bilateral downsloping sensorineural hearing loss but no low-frequency hearing loss.  Hopefully things continue along this path.  If things worsen again he should return.  Otherwise recheck hearing in 1 year.  He can consider hearing aids but he deferred at this time.    Martin Pozo MD  Otolaryngology  [facility]          Again, thank you for allowing me to participate in the care of your patient.        Sincerely,        Martin Pozo MD

## 2021-10-14 ENCOUNTER — ANCILLARY PROCEDURE (OUTPATIENT)
Dept: ULTRASOUND IMAGING | Facility: CLINIC | Age: 56
End: 2021-10-14
Attending: PHYSICIAN ASSISTANT
Payer: COMMERCIAL

## 2021-10-14 ENCOUNTER — ANCILLARY PROCEDURE (OUTPATIENT)
Dept: MAMMOGRAPHY | Facility: CLINIC | Age: 56
End: 2021-10-14
Attending: PHYSICIAN ASSISTANT
Payer: COMMERCIAL

## 2021-10-14 DIAGNOSIS — N63.10 BREAST MASS, RIGHT: ICD-10-CM

## 2021-10-14 PROCEDURE — 76642 ULTRASOUND BREAST LIMITED: CPT | Mod: RT | Performed by: RADIOLOGY

## 2021-10-14 PROCEDURE — 77066 DX MAMMO INCL CAD BI: CPT | Performed by: RADIOLOGY

## 2021-10-15 ENCOUNTER — TELEPHONE (OUTPATIENT)
Dept: SLEEP MEDICINE | Facility: CLINIC | Age: 56
End: 2021-10-15

## 2021-10-15 NOTE — TELEPHONE ENCOUNTER
Called pharmacy, gave verbal order to fill 5 mg zolpidem 1 tab.  They are filling the RX for Shon.  Spoke with Shon to let him know to go to [facility] for sleep study this weekend.  Explained he should take with him to study, and not take the pill until the sleep techs say he is ready to begin to sleep. He understood.

## 2021-10-15 NOTE — TELEPHONE ENCOUNTER
Reason for call:  Other   Patient called regarding (reason for call): call back  Additional comments: Pharmacy is re-sending a prescription to be filled (lost pill) need to be automatically pre-approve instead of denying because it has to be ready for him to go for his sleep study on arin 10/17    Phone number to reach patient:  Cell number on file:    Telephone Information:   Mobile [phone]       Best Time:  Anytime    Can we leave a detailed message on this number?  YES    Travel screening: Not Applicable

## 2021-10-17 ENCOUNTER — THERAPY VISIT (OUTPATIENT)
Dept: SLEEP MEDICINE | Facility: CLINIC | Age: 56
End: 2021-10-17
Payer: COMMERCIAL

## 2021-10-17 DIAGNOSIS — R06.89 DYSPNEA AND RESPIRATORY ABNORMALITY: ICD-10-CM

## 2021-10-17 DIAGNOSIS — R53.83 MALAISE AND FATIGUE: ICD-10-CM

## 2021-10-17 DIAGNOSIS — F51.04 CHRONIC INSOMNIA: ICD-10-CM

## 2021-10-17 DIAGNOSIS — R06.00 DYSPNEA AND RESPIRATORY ABNORMALITY: ICD-10-CM

## 2021-10-17 DIAGNOSIS — E66.09 CLASS 1 OBESITY DUE TO EXCESS CALORIES WITHOUT SERIOUS COMORBIDITY WITH BODY MASS INDEX (BMI) OF 33.0 TO 33.9 IN ADULT: Chronic | ICD-10-CM

## 2021-10-17 DIAGNOSIS — Z87.820 HISTORY OF TRAUMATIC BRAIN INJURY: Chronic | ICD-10-CM

## 2021-10-17 DIAGNOSIS — I10 ESSENTIAL HYPERTENSION: Chronic | ICD-10-CM

## 2021-10-17 DIAGNOSIS — E66.811 CLASS 1 OBESITY DUE TO EXCESS CALORIES WITHOUT SERIOUS COMORBIDITY WITH BODY MASS INDEX (BMI) OF 33.0 TO 33.9 IN ADULT: Chronic | ICD-10-CM

## 2021-10-17 DIAGNOSIS — G47.10 ORGANIC HYPERSOMNIA: ICD-10-CM

## 2021-10-17 DIAGNOSIS — G47.9 DISTURBANCE IN SLEEP BEHAVIOR: ICD-10-CM

## 2021-10-17 DIAGNOSIS — R06.83 SNORING: ICD-10-CM

## 2021-10-17 DIAGNOSIS — R53.81 MALAISE AND FATIGUE: ICD-10-CM

## 2021-10-17 PROCEDURE — 95810 POLYSOM 6/> YRS 4/> PARAM: CPT | Performed by: INTERNAL MEDICINE

## 2021-10-18 NOTE — PATIENT INSTRUCTIONS
[facility]    1. Your sleep study will be reviewed by a sleep physician within the next few days.     2. Please follow up in the sleep clinic as scheduled, or, make an appointment with your sleep provider to be seen within two weeks to discuss the results of the sleep study.    3. If you have any questions or problems with your treatment plan, please contact your sleep clinic provider at [phone] to further manage your condition.    4. Please review your attached medication list, and, at your follow-up appointment advise your sleep clinic provider about any changes.    5. Go to [email or web address] for more information about your sleep problems.    Catie Syed, PSGT  October 18, 2021

## 2021-10-19 PROBLEM — G47.33 OSA (OBSTRUCTIVE SLEEP APNEA): Chronic | Status: ACTIVE | Noted: 2021-10-19

## 2021-10-19 NOTE — PROCEDURES
SLEEP STUDY INTERPRETATION  DIAGNOSTIC POLYSOMNOGRAPHY REPORT      Patient: JAMILAH COOPER  YOB: 1965  Study Date: 10/17/2021  MRN: [id number]  Referring Provider: -  Ordering Provider: Angel Sanchez MD    Indications for Polysomnography: The patient is a 56 year old Male who is 6' and weighs 244.0 lbs. His BMI is 33.4, Blackstone sleepiness scale 11 and neck circumference is 47cm cm. A diagnostic polysomnogram was performed to evaluate for loud snoring, witnessed apneas, sleep maintenance difficulties, frequent morning headaches, restless sleep, nocturnal reflux, night sweats,  Obesity. Comorbid hypertension, history of traumatic brain injury      Polysomnogram Data: A full night polysomnogram recorded the standard physiologic parameters including EEG, EOG, EMG, ECG, nasal and oral airflow. Respiratory parameters of chest and abdominal movements were recorded with respiratory inductance plethysmography. Oxygen saturation was recorded by pulse oximetry. Hypopnea scoring rule used: 1B 4%.    Sleep Architecture:   The total recording time of the polysomnogram was 511.0 minutes. The total sleep time was 489.5 minutes. Sleep latency was decreased at 5.0 minutes with the use of a sleep aid (zolpidem). REM latency was 321.0 minutes. Arousal index was increased at 50.1 arousals per hour. Sleep efficiency was normal at 95.8%. Wake after sleep onset was 15.0 minutes. The patient spent 8.8% of total sleep time in Stage N1, 87.1% in Stage N2, 0.2% in Stage N3, and 3.9% in REM. Time in REM supine was 6.5 minutes.    Respiration:     Events ? The polysomnogram revealed a presence of 113 obstructive, 0 central, and 3 mixed apneas resulting in an apnea index of 14.2 events per hour. There were 100 obstructive hypopneas and 0 central hypopneas resulting in an obstructive hypopnea index of 12.3 and central hypopnea index of 0 events per hour. The combined apnea/hypopnea index was 26.5 events per hour (central apnea/hypopnea  index was 0 events per hour). The REM AHI was 37.9 events per hour. The supine AHI was 34.1 events per hour. The RERA index was 5.6 events per hour.  The RDI was 32.1 events per hour.    Snoring - was reported as moderate to loud.    Respiratory rate and pattern - was notable for normal respiratory rate and pattern.    Sustained Sleep Associated Hypoventilation - Transcutaneous carbon dioxide monitoring was not used, however significant hypoventilation was not suggested by oximetry     Sleep Associated Hypoxemia - (Greater than 5 minutes O2 sat at or below 88%) was present. Baseline oxygen saturation was 93.2%. Lowest oxygen saturation was 78.9%. Time spent less than or equal to 88% was 9.2 minutes. Time spent less than or equal to 89% was 13.5 minutes.    Movement Activity:     Periodic Limb Activity - There were 0 PLMs during the entire study.     REM EMG Activity - Excessive transient/sustained muscle activity was not present.    Nocturnal Behavior - Abnormal sleep related behaviors were not noted     Bruxism - None apparent.    Cardiac Summary:   The average pulse rate was 59.4 bpm. The minimum pulse rate was 51.5 bpm while the maximum pulse rate was 69.9 bpm.  Arrhythmias were not noted.      Assessment:     Moderate obstructive sleep apnea with hypoxemia and supine associated worsening   Recommendations:    Based on the presence of mild/moderate obstructive sleep apnea, treatment could be empirically initiated with Auto?titrating PAP therapy with a range of 5 to 15 cmH2O. Recommend clinical follow up with sleep management team including oximetry or HSAT on treatment.    Patient may be a candidate for dental appliance through referral to Sleep Dentistry for the treatment of obstructive sleep apnea and/or socially disruptive snoring.    If devices are not acceptable or effective, patient may benefit from evaluation of possible surgical options. If he is interested, would recommend referral to specialized  ENT-Sleep provider.    Advice regarding the risks of drowsy driving.    Suggest optimizing sleep schedule and avoiding sleep deprivation.    Weight management (if BMI > 30).    Diagnostic Codes:   Obstructive Sleep Apnea G47.33  Sleep Hypoxemia/Hypoventilation G47.36         _____________________________________   Electronically Signed By: Angel Sanchez MD 10/19/21           Range(%) Time in range (min)   0.0 - 89.0 13.5   0.0 - 88.0 9.2         Stage Min(mm Hg) Max(mm Hg)   Wake - -   NREM(1+2+3) - -   REM - -       Range(mmHg) Time in range (min)   55.0 - 100.0 -   Excluded data <20.0 & >65.0 511.5

## 2021-10-20 LAB — SLPCOMP: NORMAL

## 2021-11-22 ENCOUNTER — VIRTUAL VISIT (OUTPATIENT)
Dept: SLEEP MEDICINE | Facility: CLINIC | Age: 56
End: 2021-11-22
Payer: COMMERCIAL

## 2021-11-22 VITALS — BODY MASS INDEX: 32.51 KG/M2 | WEIGHT: 240 LBS | HEIGHT: 72 IN

## 2021-11-22 DIAGNOSIS — F51.04 CHRONIC INSOMNIA: ICD-10-CM

## 2021-11-22 DIAGNOSIS — G47.33 OSA (OBSTRUCTIVE SLEEP APNEA): Primary | ICD-10-CM

## 2021-11-22 PROCEDURE — 99214 OFFICE O/P EST MOD 30 MIN: CPT | Mod: 95 | Performed by: INTERNAL MEDICINE

## 2021-11-22 RX ORDER — DIVALPROEX SODIUM 500 MG/1
1000 TABLET, EXTENDED RELEASE ORAL
COMMUNITY
Start: 2021-10-12 | End: 2021-11-22

## 2021-11-22 RX ORDER — ALPRAZOLAM 0.5 MG
0.5 TABLET ORAL
COMMUNITY
Start: 2021-07-05 | End: 2021-11-22

## 2021-11-22 RX ORDER — ESCITALOPRAM OXALATE 20 MG/1
20 TABLET ORAL
COMMUNITY
Start: 2021-07-05

## 2021-11-22 ASSESSMENT — MIFFLIN-ST. JEOR: SCORE: 1956.63

## 2021-11-22 NOTE — PATIENT INSTRUCTIONS
Your BMI is Body mass index is 32.55 kg/m .  Weight management is a personal decision.  If you are interested in exploring weight loss strategies, the following discussion covers the approaches that may be successful. Body mass index (BMI) is one way to tell whether you are at a healthy weight, overweight, or obese. It measures your weight in relation to your height.  A BMI of 18.5 to 24.9 is in the healthy range. A person with a BMI of 25 to 29.9 is considered overweight, and someone with a BMI of 30 or greater is considered obese. More than two-thirds of American adults are considered overweight or obese.  Being overweight or obese increases the risk for further weight gain. Excess weight may lead to heart disease and diabetes.  Creating and following plans for healthy eating and physical activity may help you improve your health.  Weight control is part of healthy lifestyle and includes exercise, emotional health, and healthy eating habits. Careful eating habits lifelong are the mainstay of weight control. Though there are significant health benefits from weight loss, long-term weight loss with diet alone may be very difficult to achieve- studies show long-term success with dietary management in less than 10% of people. Attaining a healthy weight may be especially difficult to achieve in those with severe obesity. In some cases, medications, devices and surgical management might be considered.  What can you do?  If you are overweight or obese and are interested in methods for weight loss, you should discuss this with your provider.     Consider reducing daily calorie intake by 500 calories.     Keep a food journal.     Avoiding skipping meals, consider cutting portions instead.    Diet combined with exercise helps maintain muscle while optimizing fat loss. Strength training is particularly important for building and maintaining muscle mass. Exercise helps reduce stress, increase energy, and improves fitness.  Increasing exercise without diet control, however, may not burn enough calories to loose weight.       Start walking three days a week 10-20 minutes at a time    Work towards walking thirty minutes five days a week     Eventually, increase the speed of your walking for 1-2 minutes at time    In addition, we recommend that you review healthy lifestyles and methods for weight loss available through the National Institutes of Health patient information sites:  http://win.niddk.nih.gov/publications/index.htm    And look into health and wellness programs that may be available through your health insurance provider, employer, local community center, or bruce club.    Weight management plan: Patient was referred to their PCP to discuss a diet and exercise plan.
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
## 2021-12-15 ENCOUNTER — TELEPHONE (OUTPATIENT)
Dept: SLEEP MEDICINE | Facility: CLINIC | Age: 56
End: 2021-12-15
Payer: COMMERCIAL

## 2021-12-16 NOTE — TELEPHONE ENCOUNTER
Patient returned my call and we were able to schedule him for an appointment at the [facility] on Wednesday December 22nd at 1:00 pm.

## 2021-12-21 NOTE — NURSING NOTE
Message sent to patient with My Chart:  Shon Bailey  [address] [address]       [birth year]21      Dear Mr. Bailey,            Your provider has placed an order for you to get a new PAP device. Your medical equipment company will try to contact you as soon as possible to schedule your set up (Please be advised, due to a shortage of machines, this may take longer to receive call). Once you know the date of this appointment, please contact our office to schedule a follow up visit with your provider. This appointment should be scheduled 30-60 days from the day that you will be set up on your new device.      [employer] contact information:     [facility]: [phone]  [place]: [phone]  [place]: [phone]  [phone]  [place]: [phone]  [place]: [phone]        [facility]   Schedule line: [phone]    Sincerely,    Agustina Miranda, [facility]  Sleep Medicine  2021 2:21 PM

## 2021-12-21 NOTE — NURSING NOTE
DME orders have been automatically faxed to [facility].  Agustina Miranda, CMA

## 2021-12-22 ENCOUNTER — DOCUMENTATION ONLY (OUTPATIENT)
Dept: SLEEP MEDICINE | Facility: CLINIC | Age: 56
End: 2021-12-22
Payer: COMMERCIAL

## 2021-12-22 DIAGNOSIS — G47.33 OSA (OBSTRUCTIVE SLEEP APNEA): ICD-10-CM

## 2021-12-22 DIAGNOSIS — F51.04 CHRONIC INSOMNIA: ICD-10-CM

## 2021-12-22 NOTE — PROGRESS NOTES
Patient was offered choice of vendor and chose [facility].  Patient Shon Bailey was set up at [facility]  on December 22, 2021. Patient received a Resmed Airsense 11 Pressures were set at 5-15 cm H2O.   Patient s ramp is 5 cm H2O for Off and FLEX/EPR is EPR, 2.  Patient received a Respironics Mask name: Dreamwisp  Nasal mask, heated tubing and heated humidifier.  Patient does not need to meet compliance. Patient has a follow up on TBD with Dr. Justen Sandoval Her

## 2021-12-27 ENCOUNTER — DOCUMENTATION ONLY (OUTPATIENT)
Dept: SLEEP MEDICINE | Facility: CLINIC | Age: 56
End: 2021-12-27
Payer: COMMERCIAL

## 2021-12-27 DIAGNOSIS — G47.33 OSA (OBSTRUCTIVE SLEEP APNEA): ICD-10-CM

## 2021-12-27 DIAGNOSIS — F51.04 CHRONIC INSOMNIA: ICD-10-CM

## 2021-12-27 NOTE — PROGRESS NOTES
3 day Sleep therapy management telephone visit    Diagnostic AHI: 26.5  PSG    Confirmed with patient at time of call- N/A Patient is still interested in STM service       No answer. Unable to leave voicemail.      Objective data     Order Settings for PAP  CPAP min 5    CPAP max 15   Device settings from machine CPAP min 5     CPAP max 15      EPR Setting 2    RESMED soft response  Standard     Assessment: No usage but has account in [employer]      Action plan: Patient to have 14 day STM visit. Patient has a follow up visit scheduled:   yes within 31-90 days of set up    Replacement device: No  STM ordered by provider: Yes     Total time spent on accessing and  interpreting remote patient PAP therapy data  5 minutes    Total time spent counseling, coaching  and reviewing PAP therapy data with patient  0 minutes    [id number] no

## 2022-01-06 ENCOUNTER — DOCUMENTATION ONLY (OUTPATIENT)
Dept: SLEEP MEDICINE | Facility: CLINIC | Age: 57
End: 2022-01-06
Payer: COMMERCIAL

## 2022-01-06 DIAGNOSIS — G47.33 OSA (OBSTRUCTIVE SLEEP APNEA): ICD-10-CM

## 2022-01-06 DIAGNOSIS — F51.04 CHRONIC INSOMNIA: ICD-10-CM

## 2022-01-06 NOTE — PROGRESS NOTES
14  DAY [facility] VISIT    Diagnostic AHI: 26.5  PSG    Message left for patient to return call     Assessment: Pt not meeting objective benchmarks for compliance. No data in [employer], but patient has an account.       Action plan: waiting for patient to return call.       Device type: Auto-CPAP    PAP settings: CPAP min 5 cm  H20       CPAP max 15 cm  H20       RESMED EPR level [place]    RESMED Soft response setting:  Standard    Mask type:  Nasal Mask    Objective measures: 14 day rolling measures      Compliance  0 %        Objective measure goal  Compliance   Goal >70%  Leak   Goal < 24 lpm  AHI  Goal < 5  Usage  Goal >240        Total time spent on accessing and interpreting remote patient PAP therapy data  2 minutes    Total time spent counseling, coaching  and reviewing PAP therapy data with patient  0 minutes    [id number]  [id number]  no (3 day STM)

## 2022-01-24 ENCOUNTER — DOCUMENTATION ONLY (OUTPATIENT)
Dept: SLEEP MEDICINE | Facility: CLINIC | Age: 57
End: 2022-01-24
Payer: COMMERCIAL

## 2022-01-24 DIAGNOSIS — F51.04 CHRONIC INSOMNIA: ICD-10-CM

## 2022-01-24 DIAGNOSIS — G47.33 OSA (OBSTRUCTIVE SLEEP APNEA): ICD-10-CM

## 2022-01-24 NOTE — PROGRESS NOTES
30 DAY [facility] VISIT    Diagnostic AHI: 26.5  PSG    Message left for patient to return call     Assessment: Pt meeting objective benchmarks.     Action plan: waiting for patient to return call.   Patient has scheduled a follow up visit with Dr. Sanchez on 3/07/22.   Device type: Auto-CPAP  PAP settings: CPAP min 11.0 cm  H20     CPAP max 15.0 cm  H20    95th% pressure 14.4 cm  H20      RESMED EPR level Setting: TWO    RESMED Soft response setting:  OFF  Mask type:  Nasal Mask  Objective measures: 14 day rolling measures      Compliance  100 %      Leak  19.52 lpm  last  upload      AHI 2.26   last  upload      Average number of minutes 563      Objective measure goal  Compliance   Goal >70%  Leak   Goal < 24 lpm  AHI  Goal < 5  Usage  Goal >240        Total time spent on accessing and interpreting remote patient PAP therapy data  1 minutes    Total time spent counseling, coaching  and reviewing PAP therapy data with patient  0 minutes     [id number] this call  [id number] no  at 3 or 14 day [facility]

## 2022-02-03 DIAGNOSIS — G47.9 DISTURBANCE IN SLEEP BEHAVIOR: ICD-10-CM

## 2022-02-03 DIAGNOSIS — I10 ESSENTIAL HYPERTENSION: Chronic | ICD-10-CM

## 2022-02-03 DIAGNOSIS — R53.83 MALAISE AND FATIGUE: ICD-10-CM

## 2022-02-03 DIAGNOSIS — F51.04 CHRONIC INSOMNIA: ICD-10-CM

## 2022-02-03 DIAGNOSIS — R06.00 DYSPNEA AND RESPIRATORY ABNORMALITY: ICD-10-CM

## 2022-02-03 DIAGNOSIS — R06.89 DYSPNEA AND RESPIRATORY ABNORMALITY: ICD-10-CM

## 2022-02-03 DIAGNOSIS — Z87.820 HISTORY OF TRAUMATIC BRAIN INJURY: Chronic | ICD-10-CM

## 2022-02-03 DIAGNOSIS — E66.09 CLASS 1 OBESITY DUE TO EXCESS CALORIES WITHOUT SERIOUS COMORBIDITY WITH BODY MASS INDEX (BMI) OF 33.0 TO 33.9 IN ADULT: Chronic | ICD-10-CM

## 2022-02-03 DIAGNOSIS — G47.33 OSA (OBSTRUCTIVE SLEEP APNEA): ICD-10-CM

## 2022-02-03 DIAGNOSIS — R06.83 SNORING: ICD-10-CM

## 2022-02-03 DIAGNOSIS — E66.811 CLASS 1 OBESITY DUE TO EXCESS CALORIES WITHOUT SERIOUS COMORBIDITY WITH BODY MASS INDEX (BMI) OF 33.0 TO 33.9 IN ADULT: Chronic | ICD-10-CM

## 2022-02-03 DIAGNOSIS — G47.10 ORGANIC HYPERSOMNIA: ICD-10-CM

## 2022-02-03 DIAGNOSIS — R53.81 MALAISE AND FATIGUE: ICD-10-CM

## 2022-02-03 RX ORDER — ZOLPIDEM TARTRATE 5 MG/1
TABLET ORAL
Qty: 1 TABLET | Refills: 0 | OUTPATIENT
Start: 2022-02-03

## 2022-02-03 NOTE — TELEPHONE ENCOUNTER
Last Written Prescription Date:  8/26/2021  Last Fill Quantity: 1,  # refills: 0   Last office visit: 8/26/2021 & 11/22/21 with prescribing provider:  Angel Sanchez   Future Office Visit:  none

## 2022-02-07 ENCOUNTER — VIRTUAL VISIT (OUTPATIENT)
Dept: FAMILY MEDICINE | Facility: CLINIC | Age: 57
End: 2022-02-07
Payer: COMMERCIAL

## 2022-02-07 DIAGNOSIS — F32.0 MILD MAJOR DEPRESSION (H): ICD-10-CM

## 2022-02-07 DIAGNOSIS — F07.81 POST CONCUSSION SYNDROME: ICD-10-CM

## 2022-02-07 DIAGNOSIS — S06.9X0S TRAUMATIC BRAIN INJURY, WITHOUT LOSS OF CONSCIOUSNESS, SEQUELA (H): ICD-10-CM

## 2022-02-07 DIAGNOSIS — R51.9 NONINTRACTABLE EPISODIC HEADACHE, UNSPECIFIED HEADACHE TYPE: ICD-10-CM

## 2022-02-07 DIAGNOSIS — E78.1 HYPERTRIGLYCERIDEMIA: ICD-10-CM

## 2022-02-07 DIAGNOSIS — I10 HYPERTENSION GOAL BP (BLOOD PRESSURE) < 140/90: Primary | ICD-10-CM

## 2022-02-07 PROCEDURE — 99214 OFFICE O/P EST MOD 30 MIN: CPT | Mod: 95 | Performed by: FAMILY MEDICINE

## 2022-02-07 RX ORDER — METOPROLOL SUCCINATE 100 MG/1
100 TABLET, EXTENDED RELEASE ORAL DAILY
Qty: 90 TABLET | Refills: 0 | Status: SHIPPED | OUTPATIENT
Start: 2022-02-07 | End: 2022-06-27

## 2022-02-07 RX ORDER — CYCLOBENZAPRINE HCL 10 MG
10 TABLET ORAL 3 TIMES DAILY PRN
Qty: 90 TABLET | Refills: 5 | Status: SHIPPED | OUTPATIENT
Start: 2022-02-07 | End: 2022-09-26

## 2022-02-07 RX ORDER — VERAPAMIL HYDROCHLORIDE 240 MG/1
240 TABLET, FILM COATED, EXTENDED RELEASE ORAL DAILY
Qty: 90 TABLET | Refills: 1 | Status: SHIPPED | OUTPATIENT
Start: 2022-02-07 | End: 2022-09-26

## 2022-02-07 RX ORDER — NORTRIPTYLINE HYDROCHLORIDE 50 MG/1
50 CAPSULE ORAL AT BEDTIME
Qty: 90 CAPSULE | Refills: 1 | Status: SHIPPED | OUTPATIENT
Start: 2022-02-07 | End: 2022-07-26

## 2022-02-07 RX ORDER — ASPIRIN 81 MG/1
81 TABLET, CHEWABLE ORAL DAILY
COMMUNITY

## 2022-02-07 RX ORDER — CHLORAL HYDRATE 500 MG
1 CAPSULE ORAL DAILY
COMMUNITY

## 2022-02-07 NOTE — PROGRESS NOTES
Shon is a 56 year old who is being evaluated via a billable telephone visit.      What phone number would you like to be contacted at? [phone]  How would you like to obtain your AVS? MyChart    Assessment & Plan     Hypertension goal BP (blood pressure) < 140/90  The current medical regimen is effective;  continue present plan and medications.    - metoprolol succinate ER (TOPROL-XL) 100 MG 24 hr tablet; Take 1 tablet (100 mg) by mouth daily    Nonintractable episodic headache, unspecified headache type  The current medical regimen is effective;  continue present plan and medications.    - nortriptyline (PAMELOR) 50 MG capsule; Take 1 capsule (50 mg) by mouth At Bedtime    Post concussion syndrome  The current medical regimen is effective;  continue present plan and medications.  \  - nortriptyline (PAMELOR) 50 MG capsule; Take 1 capsule (50 mg) by mouth At Bedtime    Traumatic brain injury, without loss of consciousness, sequela (H)  The current medical regimen is effective;  continue present plan and medications.  - verapamil ER (CALAN-SR) 240 MG CR tablet; Take 1 tablet (240 mg) by mouth daily    Mild major depression (H)  Managed by psychiatry        15 minutes spent on the date of the encounter doing chart review, history and exam, documentation and further activities per the note         Return in about 3 months (around 5/7/2022) for BP Recheck, Lab Work.    Nilsa White, [facility]   Shon is a 56 year old who presents for the following health issues     HPI     Hypertension Follow-up      Do you check your blood pressure regularly outside of the clinic? Yes     Are you following a low salt diet? No    Are your blood pressures ever more than 140 on the top number (systolic) OR more   than 90 on the bottom number (diastolic), for example 140/90? No 127/72  Metoprolol 100 mg daily and verapamil 240 mg daily    Headaches/ TBI    Since your last clinic visit, how  "have your headaches changed?  Improved    How often are you getting headaches or migraines? \" brain freeze\" 24/7    Are you able to do normal daily activities when you have a migraine? Yes    Are you taking rescue/relief medications? (Select all that apply)     How helpful is your rescue/relief medication?  I get some relief    Are you taking any medications to prevent migraines? (Select all that apply)  Verapamil and Other: Nortriptyline    In the past 4 weeks, how often have you gone to urgent care or the emergency room because of your headaches?  0     He is seeing a Psych/TBI specialist who gave him Depakote for anxiety.  He is doing well.     He thinks he has otitis externa         How many servings of fruits and vegetables do you eat daily?  4 or more    On average, how many sweetened beverages do you drink each day (Examples: soda, juice, sweet tea, etc.  Do NOT count diet or artificially sweetened beverages)?   0-1    How many days per week do you exercise enough to make your heart beat faster? 3 or less    How many minutes a day do you exercise enough to make your heart beat faster? 9 or less    How many days per week do you miss taking your medication? 0      Review of Systems   Constitutional, HEENT, cardiovascular, pulmonary, gi and gu systems are negative, except as otherwise noted.      Objective           Vitals:  No vitals were obtained today due to virtual visit.    Physical Exam   healthy, alert and no distress  PSYCH: Alert and oriented times 3; coherent speech, normal   rate and volume, able to articulate logical thoughts, able   to abstract reason, no tangential thoughts, no hallucinations   or delusions  His affect is normal  RESP: No cough, no audible wheezing, able to talk in full sentences  Remainder of exam unable to be completed due to telephone visits          Phone call duration: 15 minutes  "

## 2022-03-07 ENCOUNTER — VIRTUAL VISIT (OUTPATIENT)
Dept: SLEEP MEDICINE | Facility: CLINIC | Age: 57
End: 2022-03-07
Payer: COMMERCIAL

## 2022-03-07 VITALS — BODY MASS INDEX: 31.83 KG/M2 | HEIGHT: 72 IN | WEIGHT: 235 LBS

## 2022-03-07 DIAGNOSIS — G47.33 OSA (OBSTRUCTIVE SLEEP APNEA): ICD-10-CM

## 2022-03-07 DIAGNOSIS — F51.04 CHRONIC INSOMNIA: ICD-10-CM

## 2022-03-07 PROCEDURE — 99213 OFFICE O/P EST LOW 20 MIN: CPT | Mod: 95 | Performed by: INTERNAL MEDICINE

## 2022-03-07 ASSESSMENT — SLEEP AND FATIGUE QUESTIONNAIRES
HOW LIKELY ARE YOU TO NOD OFF OR FALL ASLEEP WHILE SITTING AND READING: WOULD NEVER DOZE
HOW LIKELY ARE YOU TO NOD OFF OR FALL ASLEEP WHILE SITTING INACTIVE IN A PUBLIC PLACE: WOULD NEVER DOZE
HOW LIKELY ARE YOU TO NOD OFF OR FALL ASLEEP IN A CAR, WHILE STOPPED FOR A FEW MINUTES IN TRAFFIC: WOULD NEVER DOZE
HOW LIKELY ARE YOU TO NOD OFF OR FALL ASLEEP WHILE SITTING QUIETLY AFTER LUNCH WITHOUT ALCOHOL: WOULD NEVER DOZE
HOW LIKELY ARE YOU TO NOD OFF OR FALL ASLEEP WHEN YOU ARE A PASSENGER IN A CAR FOR AN HOUR WITHOUT A BREAK: WOULD NEVER DOZE
HOW LIKELY ARE YOU TO NOD OFF OR FALL ASLEEP WHILE LYING DOWN TO REST IN THE AFTERNOON WHEN CIRCUMSTANCES PERMIT: SLIGHT CHANCE OF DOZING
HOW LIKELY ARE YOU TO NOD OFF OR FALL ASLEEP WHILE SITTING AND TALKING TO SOMEONE: WOULD NEVER DOZE
HOW LIKELY ARE YOU TO NOD OFF OR FALL ASLEEP WHILE WATCHING TV: SLIGHT CHANCE OF DOZING
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
## 2022-03-07 NOTE — PROGRESS NOTES
Shon is a 56 year old who is being evaluated via a billable video visit.      How would you like to obtain your AVS? MyChart  If the video visit is dropped, the invitation should be resent by: Text to cell phone: [phone]  Will anyone else be joining your video visit? Essence Fung Facilitator     Start Time: 8:51 AM    Video-Visit Details    Type of service:  Video Visit    End Time:9:06 AM    Originating Location (pt. Location): Home    Distant Location (provider location):  [facility]     Platform used for Video Visit: Other: telephone     Patient unable to connect with Common Curriculum, doximity connection poor, I could not hear him, switched to telephone

## 2022-04-10 ENCOUNTER — HEALTH MAINTENANCE LETTER (OUTPATIENT)
Age: 57
End: 2022-04-10

## 2022-05-04 NOTE — PATIENT INSTRUCTIONS
"\"Slow-Mag\" Take 2 with dinner daily   " Last appointment 3/18/22 next appointment 7/21/22  Last rx 5/17/21 with year refills. I refilled the same and patient informed.

## 2022-07-07 ENCOUNTER — OFFICE VISIT (OUTPATIENT)
Dept: FAMILY MEDICINE | Facility: CLINIC | Age: 57
End: 2022-07-07
Payer: COMMERCIAL

## 2022-07-07 VITALS
SYSTOLIC BLOOD PRESSURE: 154 MMHG | DIASTOLIC BLOOD PRESSURE: 92 MMHG | BODY MASS INDEX: 34.05 KG/M2 | OXYGEN SATURATION: 93 % | TEMPERATURE: 97.8 F | WEIGHT: 251.05 LBS | HEART RATE: 77 BPM

## 2022-07-07 DIAGNOSIS — L72.3 SEBACEOUS CYST: ICD-10-CM

## 2022-07-07 DIAGNOSIS — I10 ESSENTIAL HYPERTENSION: Chronic | ICD-10-CM

## 2022-07-07 DIAGNOSIS — L98.9 SKIN LESION: Primary | ICD-10-CM

## 2022-07-07 PROCEDURE — 99213 OFFICE O/P EST LOW 20 MIN: CPT | Performed by: PHYSICIAN ASSISTANT

## 2022-07-07 ASSESSMENT — PAIN SCALES - GENERAL: PAINLEVEL: NO PAIN (0)

## 2022-07-07 NOTE — PROGRESS NOTES
Assessment & Plan     Skin lesion  Lesion on left shoulder that has been itchy for over a year. Suggest dermatology visit to evaluate and determine if it needs to be biopsied.   - Adult Dermatology Referral; Future    Sebaceous cyst  Cyst on back. Not bothering him in any way. Discussed leaving it vs removing it. He will let us know if it becomes bothersome.    Essential hypertension  Elevated today. Reports his home cuff measures 125/75 range frequently, he tends to be higher in clinic. Encouraged him to bring cuff to next visit to ensure accuracy.               BMI:   Estimated body mass index is 34.05 kg/m  as calculated from the following:    Height as of 3/7/22: 1.829 m (6').    Weight as of this encounter: 113.9 kg (251 lb 0.8 oz).   Weight management plan: Discussed healthy diet and exercise guidelines        Return in about 3 months (around 10/7/2022) for Routine preventive with PCP.    MILAGRO [facility] JING Menendez is a 57 year old, presenting for the following health issues:  Derm Problem      HPI     Concern - Mole check  Onset: Over a year  Description: Wife described as a bump,, cyst possible or mole on left side of upper  Back, some on legs that are raised  Intensity: mild  Progression of Symptoms:  worsening  Accompanying Signs & Symptoms: Seems to be getting bigger- wife told him to come in, as she is the one who is able to see this spot.  Previous history of similar problem: Yes  Precipitating factors:        Worsened by: N/A  Alleviating factors:        Improved by: None  Therapies tried and outcome:  none     Also - left shoulder skin lesion bothersome. Itches. Has been there for at least a year.           Review of Systems   Constitutional, HEENT, cardiovascular, pulmonary, gi and gu systems are negative, except as otherwise noted.      Objective    BP (!) 154/92   Pulse 77   Temp 97.8  F (36.6  C) (Oral)   Wt 113.9 kg (251 lb 0.8 oz)   SpO2  93%   BMI 34.05 kg/m    Body mass index is 34.05 kg/m .  Physical Exam   GENERAL: healthy, alert and no distress  MS: no gross musculoskeletal defects noted, no edema  SKIN: 2 cm mobile non tender non erythematous subcutaneous cyst on upper mid back, left shoulder 7 mm erythematous papule                    .  ..

## 2022-07-25 DIAGNOSIS — R51.9 NONINTRACTABLE EPISODIC HEADACHE, UNSPECIFIED HEADACHE TYPE: ICD-10-CM

## 2022-07-25 DIAGNOSIS — F07.81 POST CONCUSSION SYNDROME: ICD-10-CM

## 2022-07-26 RX ORDER — NORTRIPTYLINE HYDROCHLORIDE 50 MG/1
50 CAPSULE ORAL AT BEDTIME
Qty: 30 CAPSULE | Refills: 0 | Status: SHIPPED | OUTPATIENT
Start: 2022-07-26 | End: 2022-08-26

## 2022-07-26 NOTE — TELEPHONE ENCOUNTER
Routing refill request to provider for review/approval because:  Blood Pressure under 140/90 in past 12 mos    BP Readings from Last 3 Encounters:   07/07/22 (!) 154/92   10/01/21 127/82   08/17/21 (!) 161/100      Allie Tian RN

## 2022-08-24 DIAGNOSIS — R51.9 NONINTRACTABLE EPISODIC HEADACHE, UNSPECIFIED HEADACHE TYPE: ICD-10-CM

## 2022-08-24 DIAGNOSIS — F07.81 POST CONCUSSION SYNDROME: ICD-10-CM

## 2022-08-25 NOTE — TELEPHONE ENCOUNTER
Routing refill request to provider for review/approval because:  Drug not on the [facility] refill protocol   BP Readings from Last 3 Encounters:   07/07/22 (!) 154/92   10/01/21 127/82   08/17/21 (!) 161/100

## 2022-08-26 RX ORDER — NORTRIPTYLINE HYDROCHLORIDE 50 MG/1
50 CAPSULE ORAL AT BEDTIME
Qty: 30 CAPSULE | Refills: 0 | Status: SHIPPED | OUTPATIENT
Start: 2022-08-26 | End: 2022-09-26

## 2022-09-24 DIAGNOSIS — F07.81 POST CONCUSSION SYNDROME: ICD-10-CM

## 2022-09-24 DIAGNOSIS — S06.9X0S TRAUMATIC BRAIN INJURY, WITHOUT LOSS OF CONSCIOUSNESS, SEQUELA (H): ICD-10-CM

## 2022-09-24 DIAGNOSIS — I10 HYPERTENSION GOAL BP (BLOOD PRESSURE) < 140/90: ICD-10-CM

## 2022-09-24 DIAGNOSIS — R51.9 NONINTRACTABLE EPISODIC HEADACHE, UNSPECIFIED HEADACHE TYPE: ICD-10-CM

## 2022-09-26 RX ORDER — METOPROLOL SUCCINATE 100 MG/1
100 TABLET, EXTENDED RELEASE ORAL DAILY
Qty: 20 TABLET | Refills: 0 | Status: SHIPPED | OUTPATIENT
Start: 2022-09-26 | End: 2022-10-18

## 2022-09-26 RX ORDER — NORTRIPTYLINE HYDROCHLORIDE 50 MG/1
50 CAPSULE ORAL AT BEDTIME
Qty: 20 CAPSULE | Refills: 0 | Status: SHIPPED | OUTPATIENT
Start: 2022-09-26 | End: 2022-10-18

## 2022-09-26 RX ORDER — CYCLOBENZAPRINE HCL 10 MG
10 TABLET ORAL 3 TIMES DAILY PRN
Qty: 20 TABLET | Refills: 0 | Status: SHIPPED | OUTPATIENT
Start: 2022-09-26 | End: 2022-10-18

## 2022-09-26 RX ORDER — VERAPAMIL HYDROCHLORIDE 240 MG/1
240 TABLET, FILM COATED, EXTENDED RELEASE ORAL DAILY
Qty: 20 TABLET | Refills: 0 | Status: SHIPPED | OUTPATIENT
Start: 2022-09-26 | End: 2022-10-18

## 2022-10-15 ENCOUNTER — HEALTH MAINTENANCE LETTER (OUTPATIENT)
Age: 57
End: 2022-10-15

## 2022-10-17 DIAGNOSIS — R51.9 NONINTRACTABLE EPISODIC HEADACHE, UNSPECIFIED HEADACHE TYPE: ICD-10-CM

## 2022-10-17 DIAGNOSIS — I10 HYPERTENSION GOAL BP (BLOOD PRESSURE) < 140/90: ICD-10-CM

## 2022-10-17 DIAGNOSIS — F07.81 POST CONCUSSION SYNDROME: ICD-10-CM

## 2022-10-17 DIAGNOSIS — S06.9X0S TRAUMATIC BRAIN INJURY, WITHOUT LOSS OF CONSCIOUSNESS, SEQUELA (H): ICD-10-CM

## 2022-10-21 RX ORDER — VERAPAMIL HYDROCHLORIDE 240 MG/1
240 TABLET, FILM COATED, EXTENDED RELEASE ORAL DAILY
Qty: 30 TABLET | Refills: 0 | Status: SHIPPED | OUTPATIENT
Start: 2022-10-21 | End: 2022-11-21

## 2022-10-21 RX ORDER — METOPROLOL SUCCINATE 100 MG/1
100 TABLET, EXTENDED RELEASE ORAL DAILY
Qty: 30 TABLET | Refills: 0 | Status: SHIPPED | OUTPATIENT
Start: 2022-10-21 | End: 2022-11-21

## 2022-10-21 RX ORDER — CYCLOBENZAPRINE HCL 10 MG
10 TABLET ORAL 3 TIMES DAILY PRN
Qty: 20 TABLET | Refills: 0 | Status: SHIPPED | OUTPATIENT
Start: 2022-10-21 | End: 2022-11-21

## 2022-10-21 RX ORDER — NORTRIPTYLINE HYDROCHLORIDE 50 MG/1
50 CAPSULE ORAL AT BEDTIME
Qty: 30 CAPSULE | Refills: 0 | Status: SHIPPED | OUTPATIENT
Start: 2022-10-21 | End: 2022-11-21

## 2022-11-16 ENCOUNTER — OFFICE VISIT (OUTPATIENT)
Dept: DERMATOLOGY | Facility: CLINIC | Age: 57
End: 2022-11-16
Attending: PHYSICIAN ASSISTANT
Payer: COMMERCIAL

## 2022-11-16 DIAGNOSIS — L81.4 LENTIGO: ICD-10-CM

## 2022-11-16 DIAGNOSIS — D22.9 MULTIPLE BENIGN NEVI: ICD-10-CM

## 2022-11-16 DIAGNOSIS — L82.0 INFLAMED SEBORRHEIC KERATOSIS: ICD-10-CM

## 2022-11-16 DIAGNOSIS — L82.1 SEBORRHEIC KERATOSIS: ICD-10-CM

## 2022-11-16 DIAGNOSIS — L91.8 INFLAMED SKIN TAG: ICD-10-CM

## 2022-11-16 DIAGNOSIS — L72.0 EPIDERMAL CYST: Primary | ICD-10-CM

## 2022-11-16 DIAGNOSIS — D18.01 CHERRY ANGIOMA: ICD-10-CM

## 2022-11-16 DIAGNOSIS — D48.5 NEOPLASM OF UNCERTAIN BEHAVIOR OF SKIN: ICD-10-CM

## 2022-11-16 PROCEDURE — 88305 TISSUE EXAM BY PATHOLOGIST: CPT | Performed by: DERMATOLOGY

## 2022-11-16 PROCEDURE — 99203 OFFICE O/P NEW LOW 30 MIN: CPT | Mod: 25 | Performed by: PHYSICIAN ASSISTANT

## 2022-11-16 PROCEDURE — 11102 TANGNTL BX SKIN SINGLE LES: CPT | Mod: 59 | Performed by: PHYSICIAN ASSISTANT

## 2022-11-16 PROCEDURE — 17110 DESTRUCTION B9 LES UP TO 14: CPT | Performed by: PHYSICIAN ASSISTANT

## 2022-11-16 PROCEDURE — 11200 RMVL SKIN TAGS UP TO&INC 15: CPT | Mod: 59 | Performed by: PHYSICIAN ASSISTANT

## 2022-11-16 ASSESSMENT — PAIN SCALES - GENERAL: PAINLEVEL: MILD PAIN (2)

## 2022-11-16 NOTE — LETTER
"November 17, 2022      Shon Bailey  [address] [facility] [address]        Dear ,    We are writing to inform you of your test results.    A(1). L lateral shoulder:   - Seborrheic keratosis, inflamed and traumatized     No further treatment       Resulted Orders   Dermatological Path Order and Indications   Result Value Ref Range    Case Report       Surgical Pathology Report                         Case: [id number]                                  Authorizing Provider:  Rama Aguayo,    Collected:           11/16/2022 11:42 AM                                 MILAGRO                                                                         Ordering Location:     [facility]   Received:            11/16/2022 11:42 AM                                 [place]                                                                      Pathologist:           Sivakumar Peng MD                                                       Specimen:    Skin, left lateral shoulder                                                                Final Diagnosis       A(1). L lateral shoulder:  - Seborrheic keratosis, inflamed and traumatized - (see description)        Clinical Information       The patient is a 57 year old male.       Gross Description       A(1). Skin, left lateral shoulder:  The specimen is received in formalin with proper patient identification, labeled \"left lateral shoulder\".  The specimen consists of a 1.0 x 0.7 cm irregular tan skin shave.  The skin surface displays a 0.9 x 0.6 cm faint tan lesion.  The resection margin is inked, the specimen is trisected and submitted in its entirety in cassette A1.      Microscopic Description       The specimen exhibits compact orthokeratosis, papillomatous epidermal hyperplasia with horn cyst formation and a patchy lichenoid lymphocytic infiltrate. There is superficial epidermal erosion with serosanguineous crust, and papillary " dermal fibrosis, both suggestive of external trauma. The histologic differential diagnosis includes a prurigo nodule.      Performing Labs       The technical component of this testing was completed at [facility]         If you have any questions or concerns, please call the clinic at the number listed above.       Sincerely,      Rama Aguayo PA-C
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
## 2022-11-16 NOTE — PATIENT INSTRUCTIONS
Wound Care Instructions     FOR SUPERFICIAL WOUNDS      [phone]          AFTER 24 HOURS YOU SHOULD REMOVE THE BANDAGE AND BEGIN DAILY DRESSING CHANGES AS FOLLOWS:     1) Remove Dressing.     2) Clean and dry the area with tap water using a Q-tip or sterile gauze pad.     3) Apply Vaseline, Aquaphor, Polysporin ointment or Bacitracin ointment over entire wound.  Do NOT use Neosporin ointment.     4) Cover the wound with a band-aid, or a sterile non-stick gauze pad and micropore paper tape      REPEAT THESE INSTRUCTIONS AT LEAST ONCE A DAY UNTIL THE WOUND HAS COMPLETELY HEALED.    It is an old wives tale that a wound heals better when it is exposed to air and allowed to dry out. The wound will heal faster with a better cosmetic result if it is kept moist with ointment and covered with a bandage.    **Do not let the wound dry out.**      Supplies Needed:      *Cotton tipped applicators (Q-tips)    *Polysporin Ointment or Bacitracin Ointment (NOT NEOSPORIN)    *Band-aids or non-stick gauze pads and micropore paper tape.      PATIENT INFORMATION:    During the healing process you will notice a number of changes. All wounds develop a small halo of redness surrounding the wound.  This means healing is occurring. Severe itching with extensive redness usually indicates sensitivity to the ointment or bandage tape used to dress the wound.  You should call our office if this develops.      Swelling  and/or discoloration around your surgical site is common, particularly when performed around the eye.    All wounds normally drain.  The larger the wound the more drainage there will be.  After 7-10 days, you will notice the wound beginning to shrink and new skin will begin to grow.  The wound is healed when you can see skin has formed over the entire area.  A healed wound has a healthy, shiny look to the surface and is red to dark pink in color to normalize.  Wounds may take approximately 4-6 weeks to heal.   Larger wounds may take 6-8 weeks.  After the wound is healed you may discontinue dressing changes.    You may experience a sensation of tightness as your wound heals. This is normal and will gradually subside.    Your healed wound may be sensitive to temperature changes. This sensitivity improves with time, but if you re having a lot of discomfort, try to avoid temperature extremes.    Patients frequently experience itching after their wound appears to have healed because of the continue healing under the skin.  Plain Vaseline will help relieve the itching.        POSSIBLE COMPLICATIONS    BLEEDING:    Leave the bandage in place.  Use tightly rolled up gauze or a cloth to apply direct pressure over the bandage for 30  minutes.  Reapply pressure for an additional 30 minutes if necessary  Use additional gauze and tape to maintain pressure once the bleeding has stopped.

## 2022-11-17 LAB
PATH REPORT.COMMENTS IMP SPEC: NORMAL
PATH REPORT.COMMENTS IMP SPEC: NORMAL
PATH REPORT.FINAL DX SPEC: NORMAL
PATH REPORT.GROSS SPEC: NORMAL
PATH REPORT.MICROSCOPIC SPEC OTHER STN: NORMAL
PATH REPORT.RELEVANT HX SPEC: NORMAL

## 2022-11-17 NOTE — PROGRESS NOTES
Date and Time: 11/17/22 11:45 AM    Call Reason: appointment    Outcome: Left message on cell phone for Shon Bailey to call and schedule an appointment:    Clinic: Dermatology    With: Sebastien Hartmann MD    Appointment should be scheduled within:  Next available appointment    Visit Mode: in person visit     Reason for Appointment: Procedure    Provider Approved Double Book:No    Additional Information: Cyst excision Consult completed by [email or web address]

## 2022-11-18 DIAGNOSIS — S06.9X0S TRAUMATIC BRAIN INJURY, WITHOUT LOSS OF CONSCIOUSNESS, SEQUELA (H): ICD-10-CM

## 2022-11-18 DIAGNOSIS — F07.81 POST CONCUSSION SYNDROME: ICD-10-CM

## 2022-11-18 DIAGNOSIS — I10 HYPERTENSION GOAL BP (BLOOD PRESSURE) < 140/90: ICD-10-CM

## 2022-11-18 DIAGNOSIS — R51.9 NONINTRACTABLE EPISODIC HEADACHE, UNSPECIFIED HEADACHE TYPE: ICD-10-CM

## 2022-11-21 RX ORDER — CYCLOBENZAPRINE HCL 10 MG
10 TABLET ORAL 3 TIMES DAILY PRN
Qty: 20 TABLET | Refills: 0 | Status: SHIPPED | OUTPATIENT
Start: 2022-11-21 | End: 2022-12-22

## 2022-11-21 RX ORDER — NORTRIPTYLINE HYDROCHLORIDE 50 MG/1
50 CAPSULE ORAL AT BEDTIME
Qty: 30 CAPSULE | Refills: 0 | Status: SHIPPED | OUTPATIENT
Start: 2022-11-21 | End: 2022-12-22

## 2022-11-21 RX ORDER — VERAPAMIL HYDROCHLORIDE 240 MG/1
240 TABLET, FILM COATED, EXTENDED RELEASE ORAL DAILY
Qty: 30 TABLET | Refills: 0 | Status: SHIPPED | OUTPATIENT
Start: 2022-11-21 | End: 2022-12-22

## 2022-11-21 RX ORDER — METOPROLOL SUCCINATE 100 MG/1
100 TABLET, EXTENDED RELEASE ORAL DAILY
Qty: 30 TABLET | Refills: 0 | Status: SHIPPED | OUTPATIENT
Start: 2022-11-21 | End: 2022-12-22

## 2022-12-06 ENCOUNTER — MYC MEDICAL ADVICE (OUTPATIENT)
Dept: DERMATOLOGY | Facility: CLINIC | Age: 57
End: 2022-12-06

## 2022-12-06 NOTE — TELEPHONE ENCOUNTER
Pt called back and scheduled cyst excision on 12/7/22 at [place] with Dr. Hartmann at 8:15 am.    Ilene PAGAN RN  [facility] [place]  [phone]

## 2022-12-06 NOTE — TELEPHONE ENCOUNTER
Called and wife answered phone stating pt is not available.  States pt asked her to send the my chart message.  Nurse advised there is no consent to communicate on file to speak with wife and the clinic needs to speak with pt himself.  Wife will call pt to see if able to call nurse back at [phone] to schedule appt to have cyst removed.    Ilene PAGAN RN  [facility] [place]  [phone]

## 2022-12-07 ENCOUNTER — OFFICE VISIT (OUTPATIENT)
Dept: DERMATOLOGY | Facility: CLINIC | Age: 57
End: 2022-12-07
Payer: COMMERCIAL

## 2022-12-07 VITALS — SYSTOLIC BLOOD PRESSURE: 159 MMHG | OXYGEN SATURATION: 97 % | HEART RATE: 76 BPM | DIASTOLIC BLOOD PRESSURE: 89 MMHG

## 2022-12-07 DIAGNOSIS — L72.0 EPIDERMAL CYST: Primary | ICD-10-CM

## 2022-12-07 PROCEDURE — 11403 EXC TR-EXT B9+MARG 2.1-3CM: CPT | Performed by: DERMATOLOGY

## 2022-12-07 PROCEDURE — 88331 PATH CONSLTJ SURG 1 BLK 1SPC: CPT | Performed by: DERMATOLOGY

## 2022-12-07 PROCEDURE — 13100 CMPLX RPR TRUNK 1.1-2.5 CM: CPT | Performed by: DERMATOLOGY

## 2022-12-07 PROCEDURE — 99207 PR NO CHARGE LOS: CPT | Performed by: DERMATOLOGY

## 2022-12-07 ASSESSMENT — PAIN SCALES - GENERAL: PAINLEVEL: NO PAIN (0)
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
## 2022-12-07 NOTE — PATIENT INSTRUCTIONS
Sutured Wound Care   BACK    [facility]: [phone]    [facility]: [phone]          No strenuous activity for 48 hours. Resume moderate activity in 48 hours. No heavy exercising until you are seen for follow up in one week.     Take Tylenol as needed for discomfort.                         Do not drink alcoholic beverages for 48 hours.     Keep the pressure bandage in place for 24 hours. If the bandage becomes blood tinged or loose, reinforce it with gauze and tape.        (Refer to the reverse side of this page for management of bleeding).    Remove pressure bandage in 24 hours     Leave the flat bandage in place until your follow up appointment.    Keep the bandage dry. Wash around it carefully.    If the tape becomes soiled or starts to come off, reinforce it with additional paper tape.    Do not smoke for 3 weeks; smoking is detrimental to wound healing.    It is normal to have swelling and bruising around the surgical site. The bruising will fade in approximately 10-14 days. Elevate the area to reduce swelling.    Numbness, itchiness and sensitivity to temperature changes can occur after surgery and may take up to 18 months to normalize.      POSSIBLE COMPLICATIONS    BLEEDING:    Leave the bandage in place.  Use tightly rolled up gauze or a cloth to apply direct pressure over the bandage for 20   minutes.  Reapply pressure for an additional 20 minutes if necessary  Call the office or go to the nearest emergency room if pressure fails to stop the bleeding.  Use additional gauze and tape to maintain pressure once the bleeding has stopped.        PAIN:    Post operative pain should slowly get better, never worse.  A severe increase in pain may indicate a problem. Call the office if this occurs.    In case of emergency phone:Dr Hartmann [phone]
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
## 2022-12-12 ENCOUNTER — ALLIED HEALTH/NURSE VISIT (OUTPATIENT)
Dept: DERMATOLOGY | Facility: CLINIC | Age: 57
End: 2022-12-12
Payer: COMMERCIAL

## 2022-12-12 DIAGNOSIS — Z48.01 DRESSING CHANGE OR REMOVAL, SURGICAL WOUND: Primary | ICD-10-CM

## 2022-12-12 PROCEDURE — 99207 PR NO CHARGE NURSE ONLY: CPT | Performed by: STUDENT IN AN ORGANIZED HEALTH CARE EDUCATION/TRAINING PROGRAM

## 2022-12-12 NOTE — PATIENT INSTRUCTIONS
WOUND CARE INSTRUCTIONS  for  ONE WEEK AFTER SURGERY          Leave flat bandage on your skin for one week after today s bandage change.  In one week when you remove the bandage, you may resume your regular skin care routine, including washing with mild soap and water, applying moisturizer, make-up and sunscreen.    If there are any open or bleeding areas at the incision/graft site you should begin to cover the area with a bandage daily as follows:    Clean and dry the area with plain tap water using a Q-tip or sterile gauze pad.  Apply Polysporin or Bacitracin ointment to the open area.  Cover the wound with a band-aid or a sterile non-stick gauze pad and micropore paper tape.         SIGNS OF INFECTION  - If you notice any of these signs of infection, call your doctor right away: expanding redness around the wound.  - Yellow or greenish-colored pus or cloudy wound drainage.    - Red streaking spreading from the wound.  - Increased swelling, tenderness, or pain around the wound.   - Fever.    Please remember that yellow and clear drainage from a wound can be normal and related to normal wound healing.  Isolated drainage from a wound without a combination of the above features does not indicate infection.       *Once the bandages are removed, the scar will be red and firm (especially in the lip/chin area). This is normal and will fade in time. It might take 6-12 months for this to happen.     *Massaging the area will help the scar soften and fade quicker. Begin to massage the area one month after the bandages have been removed. To massage apply pressure directly and firmly over the scar with the fingertips and move in a circular motion. Massage the area for a few minutes several times a day. Continue to massage the site for several months.    *Approximately 6-8 weeks after surgery it is not uncommon to see the formation of  tender pimple-like  bump along the scar. This is normal. As the scar continues to mature and  the stitches underneath the skin begin to dissolve, this might occur. Do not pick or squeeze, this will resolve on it s own. Should one break open producing a small amount of drainage, apply Polysporin or Bacitracin ointment a few times a day until the wound is completely healed.    *Numbness in the surgical area is expected. It might take 12-18 months for the feeling to return to normal. During this time sensations of itchiness, tingling and occasional sharp pains might be noted. These feelings are normal and will subside once the nerves have completely healed.         IN CASE OF EMERGENCY: Dr Hartmann [phone]     If you were seen in [place] call: [phone]    If you were seen in [place] call: [phone]

## 2022-12-12 NOTE — PROGRESS NOTES
Shon DOROTHY Lynn comes into clinic today at the request of Dr. Hartmann Ordering Provider for Wound Check Action taken: bandage changed.      This service provided today was under the supervising provider of the day Dr. Roa, who was available if needed.     Please see providers note on 12/7/22 for orders  Patient returned to clinic for post surgery 1 week follow up bandage change. Patient has no complaints, denies pain.  Bandage removed from upper back. Area cleansed with wound cleanser. Site is healing with no signs of infection, wound edges approximating well.   Reapplied new steri strips and paper tape.     Advised to watch for signs and symptons of infection; spreading redness, increasing pain, drainage, odor, fever.   Call or report promptly to clinic if any of these symptoms are present.    Wound care post op instructions given and discussed for 7 day bandage removal and continued incision/scar care.    Patient verbalized understanding.     Bailey PERLA RN  Dermatology   [phone]

## 2022-12-22 ENCOUNTER — MYC REFILL (OUTPATIENT)
Dept: FAMILY MEDICINE | Facility: CLINIC | Age: 57
End: 2022-12-22

## 2022-12-22 ENCOUNTER — TELEPHONE (OUTPATIENT)
Dept: FAMILY MEDICINE | Facility: CLINIC | Age: 57
End: 2022-12-22

## 2022-12-22 DIAGNOSIS — F07.81 POST CONCUSSION SYNDROME: ICD-10-CM

## 2022-12-22 DIAGNOSIS — S06.9X0S TRAUMATIC BRAIN INJURY, WITHOUT LOSS OF CONSCIOUSNESS, SEQUELA (H): ICD-10-CM

## 2022-12-22 DIAGNOSIS — I10 HYPERTENSION GOAL BP (BLOOD PRESSURE) < 140/90: ICD-10-CM

## 2022-12-22 DIAGNOSIS — R51.9 NONINTRACTABLE EPISODIC HEADACHE, UNSPECIFIED HEADACHE TYPE: ICD-10-CM

## 2022-12-22 NOTE — TELEPHONE ENCOUNTER
Patient's wife calling regarding rx requests.  She reports pt is out of medication, he does have an appointment with soonest available provider at [place].    They are hoping for a 30 day fill- send high priority refill request to provider pool to address.        Nanci Nuñez RN

## 2022-12-23 RX ORDER — NORTRIPTYLINE HYDROCHLORIDE 50 MG/1
50 CAPSULE ORAL AT BEDTIME
Qty: 30 CAPSULE | Refills: 0 | OUTPATIENT
Start: 2022-12-23

## 2022-12-23 RX ORDER — VERAPAMIL HYDROCHLORIDE 240 MG/1
240 TABLET, FILM COATED, EXTENDED RELEASE ORAL DAILY
Qty: 30 TABLET | Refills: 0 | OUTPATIENT
Start: 2022-12-23

## 2022-12-23 RX ORDER — METOPROLOL SUCCINATE 100 MG/1
100 TABLET, EXTENDED RELEASE ORAL DAILY
Qty: 30 TABLET | Refills: 0 | OUTPATIENT
Start: 2022-12-23

## 2022-12-26 RX ORDER — CYCLOBENZAPRINE HCL 10 MG
10 TABLET ORAL 3 TIMES DAILY PRN
Qty: 20 TABLET | Refills: 0 | Status: SHIPPED | OUTPATIENT
Start: 2022-12-26 | End: 2023-01-11

## 2023-01-11 ENCOUNTER — OFFICE VISIT (OUTPATIENT)
Dept: FAMILY MEDICINE | Facility: CLINIC | Age: 58
End: 2023-01-11
Payer: COMMERCIAL

## 2023-01-11 VITALS
RESPIRATION RATE: 24 BRPM | OXYGEN SATURATION: 96 % | WEIGHT: 236 LBS | SYSTOLIC BLOOD PRESSURE: 148 MMHG | BODY MASS INDEX: 31.28 KG/M2 | TEMPERATURE: 98.5 F | HEIGHT: 73 IN | DIASTOLIC BLOOD PRESSURE: 101 MMHG | HEART RATE: 101 BPM

## 2023-01-11 DIAGNOSIS — Z11.4 SCREENING FOR HIV (HUMAN IMMUNODEFICIENCY VIRUS): ICD-10-CM

## 2023-01-11 DIAGNOSIS — S06.9X0S TRAUMATIC BRAIN INJURY, WITHOUT LOSS OF CONSCIOUSNESS, SEQUELA (H): ICD-10-CM

## 2023-01-11 DIAGNOSIS — F07.81 POST CONCUSSION SYNDROME: ICD-10-CM

## 2023-01-11 DIAGNOSIS — F41.0 PANIC ATTACK: ICD-10-CM

## 2023-01-11 DIAGNOSIS — I10 HYPERTENSION GOAL BP (BLOOD PRESSURE) < 140/90: Primary | ICD-10-CM

## 2023-01-11 DIAGNOSIS — R51.9 NONINTRACTABLE EPISODIC HEADACHE, UNSPECIFIED HEADACHE TYPE: ICD-10-CM

## 2023-01-11 DIAGNOSIS — Z00.00 ROUTINE GENERAL MEDICAL EXAMINATION AT A HEALTH CARE FACILITY: ICD-10-CM

## 2023-01-11 LAB
ALBUMIN SERPL BCG-MCNC: 4.4 G/DL (ref 3.5–5.2)
ALP SERPL-CCNC: 100 U/L (ref 40–129)
ALT SERPL W P-5'-P-CCNC: 39 U/L (ref 10–50)
ANION GAP SERPL CALCULATED.3IONS-SCNC: 15 MMOL/L (ref 7–15)
AST SERPL W P-5'-P-CCNC: 37 U/L (ref 10–50)
BILIRUB SERPL-MCNC: 0.5 MG/DL
BUN SERPL-MCNC: 8.6 MG/DL (ref 6–20)
CALCIUM SERPL-MCNC: 10 MG/DL (ref 8.6–10)
CHLORIDE SERPL-SCNC: 102 MMOL/L (ref 98–107)
CHOLEST SERPL-MCNC: 201 MG/DL
CREAT SERPL-MCNC: 0.91 MG/DL (ref 0.67–1.17)
DEPRECATED HCO3 PLAS-SCNC: 21 MMOL/L (ref 22–29)
GFR SERPL CREATININE-BSD FRML MDRD: >90 ML/MIN/1.73M2
GLUCOSE SERPL-MCNC: 147 MG/DL (ref 70–99)
HBV SURFACE AB SERPL IA-ACNC: 1.18 M[IU]/ML
HBV SURFACE AB SERPL IA-ACNC: NONREACTIVE M[IU]/ML
HDLC SERPL-MCNC: 38 MG/DL
LDLC SERPL CALC-MCNC: 135 MG/DL
NONHDLC SERPL-MCNC: 163 MG/DL
POTASSIUM SERPL-SCNC: 4 MMOL/L (ref 3.4–5.3)
PROT SERPL-MCNC: 8.1 G/DL (ref 6.4–8.3)
SODIUM SERPL-SCNC: 138 MMOL/L (ref 136–145)
TRIGL SERPL-MCNC: 138 MG/DL
TSH SERPL DL<=0.005 MIU/L-ACNC: 1.56 UIU/ML (ref 0.3–4.2)

## 2023-01-11 PROCEDURE — 86706 HEP B SURFACE ANTIBODY: CPT

## 2023-01-11 PROCEDURE — 80061 LIPID PANEL: CPT

## 2023-01-11 PROCEDURE — 99214 OFFICE O/P EST MOD 30 MIN: CPT

## 2023-01-11 PROCEDURE — 36415 COLL VENOUS BLD VENIPUNCTURE: CPT

## 2023-01-11 PROCEDURE — 84443 ASSAY THYROID STIM HORMONE: CPT

## 2023-01-11 PROCEDURE — 80053 COMPREHEN METABOLIC PANEL: CPT

## 2023-01-11 RX ORDER — CYCLOBENZAPRINE HCL 10 MG
10 TABLET ORAL 3 TIMES DAILY PRN
Qty: 90 TABLET | Refills: 3 | Status: SHIPPED | OUTPATIENT
Start: 2023-01-11 | End: 2023-07-19

## 2023-01-11 RX ORDER — VERAPAMIL HYDROCHLORIDE 240 MG/1
240 TABLET, FILM COATED, EXTENDED RELEASE ORAL DAILY
Qty: 90 TABLET | Refills: 1 | Status: SHIPPED | OUTPATIENT
Start: 2023-01-11 | End: 2023-07-19

## 2023-01-11 RX ORDER — CYCLOBENZAPRINE HCL 10 MG
10 TABLET ORAL 3 TIMES DAILY PRN
Qty: 20 TABLET | Refills: 3 | Status: SHIPPED | OUTPATIENT
Start: 2023-01-11 | End: 2023-01-11

## 2023-01-11 RX ORDER — NORTRIPTYLINE HYDROCHLORIDE 50 MG/1
50 CAPSULE ORAL AT BEDTIME
Qty: 90 CAPSULE | Refills: 0 | Status: SHIPPED | OUTPATIENT
Start: 2023-01-11 | End: 2023-01-11

## 2023-01-11 RX ORDER — NORTRIPTYLINE HYDROCHLORIDE 50 MG/1
50 CAPSULE ORAL AT BEDTIME
Qty: 90 CAPSULE | Refills: 1 | Status: SHIPPED | OUTPATIENT
Start: 2023-01-11 | End: 2023-07-19

## 2023-01-11 RX ORDER — METOPROLOL SUCCINATE 100 MG/1
100 TABLET, EXTENDED RELEASE ORAL DAILY
Qty: 90 TABLET | Refills: 1 | Status: SHIPPED | OUTPATIENT
Start: 2023-01-11 | End: 2023-07-19

## 2023-01-11 RX ORDER — ALPRAZOLAM 0.5 MG
0.5 TABLET ORAL PRN
COMMUNITY
Start: 2022-11-19 | End: 2023-01-11

## 2023-01-11 ASSESSMENT — PAIN SCALES - GENERAL: PAINLEVEL: MILD PAIN (2)

## 2023-01-11 NOTE — PROGRESS NOTES
Assessment & Plan     Hypertension goal BP (blood pressure) < 140/90  Chronic. Above goal in clinic today but pt reports significant anxiety/panic attack prior to appointment. Refilled 6 months metoprolol and we discussed checking more frequently at home. If BP consistently above 130/90 should discuss adjusting meds since uncontrolled HTN could contribute to his chronic headaches.   - metoprolol succinate ER (TOPROL XL) 100 MG 24 hr tablet  Dispense: 90 tablet; Refill: 1    Traumatic brain injury, without loss of consciousness, sequela (H)  Chronic. Takes verapamil for headaches/dizziness r/t TBI. Symptoms have improved over time. Patient asking about discontinuing this medication since headaches have improved and pt prefers to take as few medications as possible. Advised staying on verapamil for its antihypertensive effects .   - verapamil ER (CALAN-SR) 240 MG CR tablet  Dispense: 90 tablet; Refill: 1    Panic attack  Chronic. meds managed by psychiatry. [place] checking TSH.   - TSH with free T4 reflex    Post concussion syndrome  Chronic tension headaches, depression/anxiety and panic attacks since TBI. Follows with psych Meds refilled for 6 months. Pt plans to discuss weaning off nortriptyline with psychiatrist. Refilled meds for 6 months, pt will follow up with psych in spring and let me know what doses he needs ongoing refills for prior to July refill.   - nortriptyline (PAMELOR) 50 MG capsule  Dispense: 90 capsule; Refill: 1  - cyclobenzaprine (FLEXERIL) 10 MG tablet  Dispense: 90 tablet; Refill: 3    Nonintractable episodic headache, unspecified headache type  Chronic. Improving. Refilled notriptyline, pt considering weaning this med. Advised discussing with psych. Will refill based on pt reported current dose.   - nortriptyline (PAMELOR) 50 MG capsule  Dispense: 90 capsule; Refill: 1    Routine general medical examination at a health care facility  - REVIEW OF HEALTH MAINTENANCE PROTOCOL ORDERS  -  "Comprehensive metabolic panel (BMP + Alb, Alk Phos, ALT, AST, Total. Bili, TP)  - Lipid Profile (Chol, Trig, HDL, LDL calc)  - Hepatitis B Surface Antibody        Ordering of each unique test  Prescription drug management         BMI:   Estimated body mass index is 31.57 kg/m  as calculated from the following:    Height as of this encounter: 1.842 m (6' 0.5\").    Weight as of this encounter: 107 kg (236 lb).   Weight management plan: Discussed healthy diet and exercise guidelines    PATIENT INSTRUCTIONS  Meds refilled for 90 day supplies with 1 refill - 6 months total. Let me know in June if you need any changes for next set of refills.     Discuss taper plan for nortriptyline with psychiatrist for advice even thought this med is for headaches (it is commonly used for depression).     Youtube - try guided imagery, guided meditation, yoga stretching videos to increase physical activity.     Consider shingles vaccine - get at a pharmacy     Return in about 6 months (around 7/11/2023).    BONNIE Pineda [facility]    Fede Menendez is a 57 year old, presenting for the following health issues:  Recheck Medication      History of Present Illness       Reason for visit:  Med check-in    He eats 2-3 servings of fruits and vegetables daily.He consumes 0 sweetened beverage(s) daily.He exercises with enough effort to increase his heart rate 9 or less minutes per day.  He exercises with enough effort to increase his heart rate 3 or less days per week.   He is taking medications regularly.     Discuss weaning off nortriptyline and verapamil. Had headaches from a serious injury (TBI), but headaches have improved.      Side effects of notriptyline, vivid dreams, nightmares, has discussed with psychiatrist.     Meloxicam 7.5 mg tablets for nausea/dizziness    Hypertension Follow-up    Do you check your blood pressure regularly outside of the clinic? No     Are you following a low salt diet? " No    Are your blood pressures ever more than 140 on the top number (systolic) OR more   than 90 on the bottom number (diastolic), for example 140/90? Yes  No chest pain, shortness of breath, no vision changes (other than normal/baseline since TBI), urinary symptoms, no change in dizziness symptoms, no altered mental status of confusion.      Current Outpatient Medications   Medication     ALPRAZolam (XANAX) 0.5 MG tablet - if needed for panic attacks, 1 dose usually relief     aspirin (ASA) 81 MG chewable tablet - daily     cyclobenzaprine (FLEXERIL) 10 MG tablet - if needed for severe headache     escitalopram (LEXAPRO) 20 MG tablet - depression/anxiety post TBI, filled with psychiatrist     fish oil-omega-3 fatty acids 1000 MG capsule     ketoconazole (NIZORAL) 2 % external cream     LORATADINE 10 MG OR TABS     MAGNESIUM PO      meclizine (ANTIVERT) 25 MG tablet - for dizziness   Meloxicam - for nausea/dizziness for meniere's disease flares     metoprolol succinate ER (TOPROL XL) 100 MG 24 hr tablet     MULTI-VITAMIN PO TABS     nortriptyline (PAMELOR) 50 MG capsule - tried weaning, headaches worsened     ondansetron (ZOFRAN-ODT) 4 MG ODT tab     verapamil ER (CALAN-SR) 240 MG CR tablet - tried weaning, headaches worsened     VITAMIN D PO     No current facility-administered medications for this visit.     Psychiatrist fills xanax and Lexapro - visits every 3-6 months.   Prefers 90 day supply of medications, and to fill at same time.       BP Readings from Last 6 Encounters:   01/11/23 (!) 148/101   12/07/22 (!) 159/89   07/07/22 (!) 154/92   10/01/21 127/82   08/17/21 (!) 161/100   07/02/21 (!) 156/86       Review of Systems   Constitutional, HEENT, cardiovascular, pulmonary, gi and gu systems are negative, except as otherwise noted.      Objective    BP (!) 148/101 (BP Location: Right arm, Patient Position: Chair, Cuff Size: Adult Large)   Pulse 101   Temp 98.5  F (36.9  C) (Oral)   Resp 24   Ht 1.842 m  "(6' 0.5\")   Wt 107 kg (236 lb)   SpO2 96%   BMI 31.57 kg/m    Body mass index is 31.57 kg/m .  Physical Exam   GENERAL: healthy, alert and no distress  NECK: no adenopathy, no asymmetry, masses, or scars and thyroid normal to palpation  RESP: lungs clear to auscultation - no rales, rhonchi or wheezes  CV: regular rate and rhythm, normal S1 S2, no S3 or S4, no murmur, click or rub, no peripheral edema and peripheral pulses strong  ABDOMEN: soft, nontender, no hepatosplenomegaly, no masses and bowel sounds normal  MS: no gross musculoskeletal defects noted, no edema    No results found for any visits on 01/11/23.  No results found for this or any previous visit (from the past 24 hour(s)).                "

## 2023-01-11 NOTE — PATIENT INSTRUCTIONS
Meds refilled for 90 day supplies with 1 refill - 6 months total. Let me know in June if you need any changes for next set of refills.     Discuss taper plan for nortriptyline with psychiatrist for advice even thought this med is for headaches (it is commonly used for depression).     Youtube - try guided imagery, guided meditation, yoga stretching videos to increase physical activity.     Consider shingles vaccine - get at a pharmacy

## 2023-01-19 DIAGNOSIS — G47.33 OSA (OBSTRUCTIVE SLEEP APNEA): Primary | Chronic | ICD-10-CM

## 2023-01-31 ENCOUNTER — TELEPHONE (OUTPATIENT)
Dept: FAMILY MEDICINE | Facility: CLINIC | Age: 58
End: 2023-01-31

## 2023-01-31 NOTE — LETTER
March 7, 2023    To  Shon Bailey  [address] [address]    Your team at [facility] cares about your health. We have reviewed your chart and based on our findings; we are making the following recommendations to better manage your health.     You are in particular need of attention regarding the following:     Schedule an office visit with your PRIMARY CARE PHYSICIAN for mental health follow-up.  HYPERTENSION FOLLOW UP: Office Visit  PREVENTATIVE VISIT: Physical    If you have already completed these items, please contact the clinic via phone or   [email or web address] so your care team can review and update your records. Thank you for   choosing [facility] for your healthcare needs. For any questions,   concerns, or to schedule an appointment please contact our clinic.    Healthy Regards,      Your [facility] Care Team

## 2023-01-31 NOTE — TELEPHONE ENCOUNTER
Patient Quality Outreach    Patient is due for the following:   Hypertension -  Hypertension follow-up visit  Depression  -  PHQ-9 needed and Depression follow-up visit  Physical Preventive Adult Physical      Topic Date Due     Hepatitis B Vaccine (1 of 3 - 3-dose series) Never done     Zoster (Shingles) Vaccine (1 of 2) Never done       Next Steps:   Schedule a office visit for depression, anxiety, and hypertension Adult Preventative    Type of outreach:    Sent [employer] message.      Questions for provider review:    None     Kylah Mejia, CMA

## 2023-02-03 ENCOUNTER — TELEPHONE (OUTPATIENT)
Dept: FAMILY MEDICINE | Facility: CLINIC | Age: 58
End: 2023-02-03
Payer: COMMERCIAL

## 2023-02-03 NOTE — TELEPHONE ENCOUNTER
----- Message from BONNIE Mckeon CNP sent at 2/2/2023  7:43 PM CST -----  Please call patient to advise lab follow up from prior visit w me.     -Cholesterol levels (LDL,HDL, Triglycerides) are okay - stable/improved from last year. Advise increasing exercise, increase fiber and healthy fat intake to get HDL higher. Can work on reducing saturated fat too.  -Liver and gallbladder tests are normal (ALT,AST, Alk phos, bilirubin), kidney function is normal (Cr, GFR), sodium is normal, potassium is normal, calcium is normal.   - glucose is elevated, we should recheck fasting labs in 3-6 months and get an A1C to screen for diabetes and get a baseline A1C for ongoing monitoring.  -TSH (thyroid stimulating hormone) level is normal which indicates normal thyroid function.  - Hep B was non-reactive, he should complete a hep B series if he is interested because he does not have immunity.  For additional lab test information, labtestsonline.org is an excellent reference..    Please let me know if you have any questions or concerns.      Regards,  BONNIE Pineda CNP

## 2023-02-03 NOTE — TELEPHONE ENCOUNTER
Attempt #1 to call patient.     RN left voicemail and requested return call to [facility] at [phone].     Natalia More RN  [facility]: [place]

## 2023-02-06 NOTE — TELEPHONE ENCOUNTER
Attempt #2 to call patient.     Pt wife answered- pt wife states he is coming in to clinic today and will ask to talk to someone when he get here.    Natalia More RN  [facility]: [place]

## 2023-03-07 NOTE — TELEPHONE ENCOUNTER
Patient Quality Outreach    Patient is due for the following:   Hypertension -  Hypertension follow-up visit  Depression  -  PHQ-9 needed and Depression follow-up visit  Physical Preventive Adult Physical      Topic Date Due     Hepatitis B Vaccine (1 of 3 - 3-dose series) Never done     Zoster (Shingles) Vaccine (1 of 2) Never done       Next Steps:   Schedule a office visit for depression and hypertension Adult Preventative    Type of outreach:    Sent letter.    Next Steps:  Reach out within 90 days via [employer].    Max number of attempts reached: Yes. Will try again in 90 days if patient still on fail list.    Questions for provider review:    None     Kylah Mejia, CMA

## 2023-06-01 ENCOUNTER — HEALTH MAINTENANCE LETTER (OUTPATIENT)
Age: 58
End: 2023-06-01

## 2023-06-22 ENCOUNTER — TELEPHONE (OUTPATIENT)
Dept: FAMILY MEDICINE | Facility: CLINIC | Age: 58
End: 2023-06-22
Payer: COMMERCIAL

## 2023-06-22 NOTE — TELEPHONE ENCOUNTER
Pt's wife calling on behalf of .       Pt hurt his pinkie toe at the water park yesterday and wanted to be seen today for x ray.  No appointment available today or tomorrow within specified time frame.    Recommended they go to a walk in orthopedic clinic or  for x ray.  Gave her number to Omar allyson in [facility] to see if they could set up an appointment tomorrow morning.        Nanci Nuñez RN

## 2023-07-19 ENCOUNTER — LAB (OUTPATIENT)
Dept: FAMILY MEDICINE | Facility: CLINIC | Age: 58
End: 2023-07-19

## 2023-07-19 ENCOUNTER — VIRTUAL VISIT (OUTPATIENT)
Dept: FAMILY MEDICINE | Facility: CLINIC | Age: 58
End: 2023-07-19
Payer: COMMERCIAL

## 2023-07-19 DIAGNOSIS — Z12.11 SCREEN FOR COLON CANCER: ICD-10-CM

## 2023-07-19 DIAGNOSIS — R51.9 NONINTRACTABLE EPISODIC HEADACHE, UNSPECIFIED HEADACHE TYPE: ICD-10-CM

## 2023-07-19 DIAGNOSIS — S06.9X0S TRAUMATIC BRAIN INJURY, WITHOUT LOSS OF CONSCIOUSNESS, SEQUELA (H): ICD-10-CM

## 2023-07-19 DIAGNOSIS — F07.81 POST CONCUSSION SYNDROME: ICD-10-CM

## 2023-07-19 DIAGNOSIS — Z79.899 ON STATIN THERAPY: ICD-10-CM

## 2023-07-19 DIAGNOSIS — H81.01 MENIERE'S DISEASE, RIGHT: ICD-10-CM

## 2023-07-19 DIAGNOSIS — E78.5 DYSLIPIDEMIA (HIGH LDL; LOW HDL): ICD-10-CM

## 2023-07-19 DIAGNOSIS — R73.01 ELEVATED FASTING GLUCOSE: ICD-10-CM

## 2023-07-19 DIAGNOSIS — I10 HYPERTENSION GOAL BP (BLOOD PRESSURE) < 140/90: Primary | ICD-10-CM

## 2023-07-19 PROCEDURE — 99214 OFFICE O/P EST MOD 30 MIN: CPT | Mod: 95

## 2023-07-19 RX ORDER — METOPROLOL SUCCINATE 100 MG/1
100 TABLET, EXTENDED RELEASE ORAL DAILY
Qty: 90 TABLET | Refills: 1 | Status: SHIPPED | OUTPATIENT
Start: 2023-07-19 | End: 2024-01-22

## 2023-07-19 RX ORDER — ONDANSETRON 4 MG/1
4 TABLET, ORALLY DISINTEGRATING ORAL EVERY 8 HOURS PRN
Qty: 20 TABLET | Refills: 3 | Status: SHIPPED | OUTPATIENT
Start: 2023-07-19 | End: 2024-04-06

## 2023-07-19 RX ORDER — NORTRIPTYLINE HYDROCHLORIDE 50 MG/1
50 CAPSULE ORAL AT BEDTIME
Qty: 90 CAPSULE | Refills: 1 | Status: SHIPPED | OUTPATIENT
Start: 2023-07-19 | End: 2024-04-06

## 2023-07-19 RX ORDER — VERAPAMIL HYDROCHLORIDE 240 MG/1
240 TABLET, FILM COATED, EXTENDED RELEASE ORAL DAILY
Qty: 90 TABLET | Refills: 1 | Status: SHIPPED | OUTPATIENT
Start: 2023-07-19 | End: 2024-01-22

## 2023-07-19 RX ORDER — ATORVASTATIN CALCIUM 10 MG/1
10 TABLET, FILM COATED ORAL DAILY
Qty: 90 TABLET | Refills: 1 | Status: SHIPPED | OUTPATIENT
Start: 2023-07-19 | End: 2024-01-23

## 2023-07-19 RX ORDER — CYCLOBENZAPRINE HCL 10 MG
10 TABLET ORAL 3 TIMES DAILY PRN
Qty: 90 TABLET | Refills: 3 | Status: SHIPPED | OUTPATIENT
Start: 2023-07-19 | End: 2024-01-23

## 2023-07-19 ASSESSMENT — PATIENT HEALTH QUESTIONNAIRE - PHQ9
SUM OF ALL RESPONSES TO PHQ QUESTIONS 1-9: 2
10. IF YOU CHECKED OFF ANY PROBLEMS, HOW DIFFICULT HAVE THESE PROBLEMS MADE IT FOR YOU TO DO YOUR WORK, TAKE CARE OF THINGS AT HOME, OR GET ALONG WITH OTHER PEOPLE: NOT DIFFICULT AT ALL
SUM OF ALL RESPONSES TO PHQ QUESTIONS 1-9: 2

## 2023-07-19 NOTE — PATIENT INSTRUCTIONS
Derek Menendez, it was a pleasure seeing you again today. Here is a summary of your plan:     Schedule labs and nurse blood pressure appointment at your next earliest availability (baseline CK and A1C)  Start atorvastatin AFTER you have completed these labs.  Schedule additional lab only appointment for 2-4 weeks AFTER you have started the atorvastatin. (Liver panel to monitor after starting statin).   Schedule follow up with me in 3 months (recheck lipids, recheck CK and liver panel, follow up on blood sugar/diabetes).   Remember to watch for col[employer] colon cancer screening kit in the mail (advised every 3 years, last completed in 06/2020).    Start atorvastatin (Lipitor) 10 mg daily     Blood pressure check / lab appointments    Call main scheduling line, ask to schedule nurse appointment for blood pressure check AND a lab only appointment around the same time.    DME (medical equipment form) for home blood pressure monitor.   Fill out consent to communicate form for wife to schedule appts    If upcoming A1C level is in diabetes range I will send dietician/nutrition referral and follow these tips:    Tips to prevent/improve blood sugar  - activity after eating 10-15 minutes  - reduce sources of high carb amounts (sweetened coffee)  - reduce food/snacking after 8 pm

## 2023-07-19 NOTE — PROGRESS NOTES
Shon is a 58 year old who is being evaluated via a billable video visit.      How would you like to obtain your AVS? MyChart  If the video visit is dropped, the invitation should be resent by: Text to cell phone: [phone]  Will anyone else be joining your video visit? No      Assessment & Plan     Hypertension goal BP (blood pressure) < 140/90  Chronic, uncontrolled. Pt reports BP likely high in clinic due to significant anxiety with in person visits (last time checked in winter/snowstorm). No home BP checks yet due to broken machine. Sending DME order to get reliable device. Refilled metoprolol for now, also on verapamil for mental health/anxiety which has antihtn effects.  - metoprolol succinate ER (TOPROL XL) 100 MG 24 hr tablet  Dispense: 90 tablet; Refill: 1  - Home Blood Pressure Monitor Order for DME - ONLY FOR DME    Post concussion syndrome  Traumatic brain injury, without loss of consciousness, sequela (H)  Nonintractable episodic headache, unspecified headache type  Chronic, improving/stable. Refilling. Current symptoms: anxiety, depression, headaches.   - verapamil ER (CALAN-SR) 240 MG CR tablet  Dispense: 90 tablet; Refill: 1  - cyclobenzaprine (FLEXERIL) 10 MG tablet  Dispense: 90 tablet; Refill: 3  - nortriptyline (PAMELOR) 50 MG capsule  Dispense: 90 capsule; Refill: 1    Meniere's disease, right  Chronic, well controlled. Refilling prn med.   - ondansetron (ZOFRAN ODT) 4 MG ODT tab  Dispense: 20 tablet; Refill: 3    Dyslipidemia (high LDL; low HDL)  Chronic, starting atorvastatin today. Will need to stay on low dose since he is also on verapamil. And there is interaction.   - atorvastatin (LIPITOR) 10 MG tablet  Dispense: 90 tablet; Refill: 1    On statin therapy  Starting statin today, monitor CK level since verapamil/statin interaction possible.   - CK total    Elevated fasting glucose  - Hemoglobin A1c    Screen for colon cancer  - SHAYY([employer])      Ordering of each unique  "test  Prescription drug management     30 minutes spent by me doing chart review, history and exam, documentation and further activities per the note       BMI:   Estimated body mass index is 31.57 kg/m  as calculated from the following:    Height as of 1/11/23: 1.842 m (6' 0.5\").    Weight as of 1/11/23: 107 kg (236 lb).   See Patient Instructions    BONNIE Pineda CNP  [facility]    Fede Menendez is a 58 year old, presenting for the following health issues:  Refill Request        7/19/2023     2:12 PM   Additional Questions   Roomed by Shaun MITCHELL MA     History of Present Illness       Reason for visit:  Med check in    He eats 2-3 servings of fruits and vegetables daily.He consumes 1 sweetened beverage(s) daily.He exercises with enough effort to increase his heart rate 9 or less minutes per day.  He exercises with enough effort to increase his heart rate 3 or less days per week.   He is taking medications regularly.    Today's PHQ-9         PHQ-9 Total Score: 2    PHQ-9 Q9 Thoughts of better off dead/self-harm past 2 weeks :   Not at all    How difficult have these problems made it for you to do your work, take care of things at home, or get along with other people: Not difficult at all       Hypertension Follow-up  Do you check your blood pressure regularly outside of the clinic? No Got home BP machine, but it does not work, provided by his health insurance company.  Are you following a low salt diet? No  Are your blood pressures ever more than 140 on the top number (systolic) OR more   than 90 on the bottom number (diastolic), for example 140/90? Pt is not checking bp.   No chest pain, no vision changes, no urinary changes. No paresthesia (did have some in the past r/to accident, has resolved).    Hyperlipidema  - quit eating fast food last year, increased fruit and whole foods, takes fiber and fish oil.   - not on statin currently but open to it.     The 10-year ASCVD risk " score (Sonia ARANGO, et al., 2019) is: 13.5%    Values used to calculate the score:      Age: 58 years      Sex: Male      Is Non- : No      Diabetic: No      Tobacco smoker: No      Systolic Blood Pressure: 148 mmHg      Is BP treated: Yes      HDL Cholesterol: 38 mg/dL      Total Cholesterol: 201 mg/dL    Wt Readings from Last 5 Encounters:   01/11/23 107 kg (236 lb)   07/07/22 113.9 kg (251 lb 0.8 oz)   03/07/22 106.6 kg (235 lb)   11/22/21 108.9 kg (240 lb)   10/01/21 108 kg (238 lb)   feels like likely maintained or lost some wt.      BP Readings from Last 3 Encounters:   01/11/23 (!) 148/101   12/07/22 (!) 159/89   07/07/22 (!) 154/92                 Review of Systems   Constitutional, HEENT, cardiovascular, pulmonary, gi and gu systems are negative, except as otherwise noted.      Objective           Vitals:  No vitals were obtained today due to virtual visit.    Physical Exam   GENERAL: Healthy, alert and no distress  EYES: Eyes grossly normal to inspection.  No discharge or erythema, or obvious scleral/conjunctival abnormalities.  RESP: No audible wheeze, cough, or visible cyanosis.  No visible retractions or increased work of breathing.    SKIN: Visible skin clear. No significant rash, abnormal pigmentation or lesions.  NEURO: Cranial nerves grossly intact.  Mentation and speech appropriate for age.  PSYCH: Mentation appears normal, affect normal/bright, judgement and insight intact, normal speech and appearance well-groomed.    No results found for any visits on 07/19/23.            Video-Visit Details    Type of service:  Video Visit   Video Start Time:  2:30 PM  Video End Time: 3:00 PM    Originating Location (pt. Location): Home  Distant Location (provider location):  On-site  Platform used for Video Visit: [employer]

## 2023-07-21 ENCOUNTER — TELEPHONE (OUTPATIENT)
Dept: FAMILY MEDICINE | Facility: CLINIC | Age: 58
End: 2023-07-21
Payer: COMMERCIAL

## 2023-07-21 NOTE — TELEPHONE ENCOUNTER
Please call patient - let him know there is a change in his med instructions after I further reviewed his chart and med list.     1. We should have another conversation at next appointment about verapamil and if combo of meds.  -   metoprolol + verapamil interaction: risk of causing lower than normal HR. Not super worrisome since pt has hid high/normal HR at past visits but should monitor.   -  Due to interaction between verapamil and statins, there is possible INCREASE in serum concentration of VERAPAMIL OR ATORVASTATIN.      Plan:  He should NOT increase his statin from 10 to 20 mg after 2 weeks. Stick with 10 for first 3 months.     Otherwise continue all meds the same for now and monitor for any symptoms and labs like we are already planning.      What to watch for:  If VERAPAMIL concentration were increased  - more blood pressure lowering effects which could be beneficial for him.  - headaches more frequent or different/new HA symptoms, dizziness, blurry vision, lightheaded would be things to report    If STATIN concentration were increased  - same thing we discussed in clinic: sore muscles, weakness.

## 2023-07-21 NOTE — LETTER
July 28, 2023      Shon DOROTHY Lynn  [address] [address]        Dear ,    We are writing to inform you of your test results. We have been trying to reach you by phone but cannot get a hold of you.         1. We should have another conversation at next appointment about verapamil and if combo of meds.  -   metoprolol + verapamil interaction: risk of causing lower than normal HR. Not super worrisome since  has hid high/normal HR at past visits but should monitor.   -  Due to interaction between verapamil and statins, there is possible INCREASE in serum concentration of VERAPAMIL OR ATORVASTATIN.        Plan:  You should NOT increase his statin from 10 to 20 mg after 2 weeks. Stick with 10 for first 3 months.      Otherwise continue all meds the same for now and monitor for any symptoms and labs like we are already planning.        What to watch for:  If VERAPAMIL concentration were increased  - more blood pressure lowering effects which could be beneficial for him.  - headaches more frequent or different/new HA symptoms, dizziness, blurry vision, lightheaded would be things to report     If STATIN concentration were increased  - same thing we discussed in clinic: sore muscles, weakness.          If you have any questions or concerns, please call the clinic at the number listed above.       Sincerely,      [facility] Team

## 2023-07-24 ENCOUNTER — ALLIED HEALTH/NURSE VISIT (OUTPATIENT)
Dept: FAMILY MEDICINE | Facility: CLINIC | Age: 58
End: 2023-07-24
Payer: COMMERCIAL

## 2023-07-24 ENCOUNTER — APPOINTMENT (OUTPATIENT)
Dept: LAB | Facility: CLINIC | Age: 58
End: 2023-07-24
Payer: COMMERCIAL

## 2023-07-24 VITALS — SYSTOLIC BLOOD PRESSURE: 129 MMHG | DIASTOLIC BLOOD PRESSURE: 79 MMHG

## 2023-07-24 DIAGNOSIS — R73.01 ELEVATED FASTING GLUCOSE: ICD-10-CM

## 2023-07-24 DIAGNOSIS — Z79.899 ON STATIN THERAPY: ICD-10-CM

## 2023-07-24 DIAGNOSIS — Z11.4 SCREENING FOR HIV (HUMAN IMMUNODEFICIENCY VIRUS): ICD-10-CM

## 2023-07-24 DIAGNOSIS — Z01.30 BP CHECK: Primary | ICD-10-CM

## 2023-07-24 LAB — HBA1C MFR BLD: 6.6 % (ref 0–5.6)

## 2023-07-24 PROCEDURE — 82550 ASSAY OF CK (CPK): CPT

## 2023-07-24 PROCEDURE — 83036 HEMOGLOBIN GLYCOSYLATED A1C: CPT

## 2023-07-24 PROCEDURE — 87389 HIV-1 AG W/HIV-1&-2 AB AG IA: CPT

## 2023-07-24 PROCEDURE — 36415 COLL VENOUS BLD VENIPUNCTURE: CPT

## 2023-07-24 PROCEDURE — 99207 PR NO CHARGE NURSE ONLY: CPT

## 2023-07-24 NOTE — PROGRESS NOTES
Shon Bailey is a 58 year old patient who comes in today for a Blood Pressure check.  Initial BP:  /79 (BP Location: Left arm, Patient Position: Chair, Cuff Size: Adult Large)      Data Unavailable  Disposition: results routed to provider    Crystal ANDREWS MA

## 2023-07-24 NOTE — TELEPHONE ENCOUNTER
Attempt #1 to call patient.     RN left voicemail and requested return call to [facility] at [phone].     Natalia More RN  [facility]: [place]

## 2023-07-25 LAB
CK SERPL-CCNC: 176 U/L (ref 39–308)
HIV 1+2 AB+HIV1 P24 AG SERPL QL IA: NONREACTIVE

## 2023-07-27 NOTE — TELEPHONE ENCOUNTER
Attempt #3:    Left message for patient to call [facility] at [phone].    Sent MyChart with below information from provider, since we have not been able to reach patient by phone.    Once MyChart is read, okay to close encounter.      Isaac Yoder RN

## 2023-07-28 NOTE — TELEPHONE ENCOUNTER
Pt calling back    RN relayed provider message    Patient verbalized understanding and in agreement with plan of care.     Natalia More RN

## 2023-09-15 LAB — NONINV COLON CA DNA+OCC BLD SCRN STL QL: NEGATIVE

## 2023-09-21 ENCOUNTER — LAB (OUTPATIENT)
Dept: LAB | Facility: CLINIC | Age: 58
End: 2023-09-21
Payer: COMMERCIAL

## 2023-09-21 DIAGNOSIS — R73.01 ELEVATED FASTING GLUCOSE: ICD-10-CM

## 2023-09-21 LAB — HBA1C MFR BLD: 5.8 % (ref 0–5.6)

## 2023-09-21 PROCEDURE — 36415 COLL VENOUS BLD VENIPUNCTURE: CPT

## 2023-09-21 PROCEDURE — 83036 HEMOGLOBIN GLYCOSYLATED A1C: CPT

## 2023-10-12 ENCOUNTER — TELEPHONE (OUTPATIENT)
Dept: DERMATOLOGY | Facility: CLINIC | Age: 58
End: 2023-10-12

## 2023-10-12 NOTE — TELEPHONE ENCOUNTER
Left message on answering machine for patient to call back.  Thank you,    Porsche ROBERTSNORN BSN  [facility]- [phone]

## 2023-10-17 NOTE — TELEPHONE ENCOUNTER
See [employer] message.    Thank you,    Porsche ROBERTSONRN BSN  [facility]- [phone]

## 2024-01-01 NOTE — TELEPHONE ENCOUNTER
Attempt #2 to call patient.     RN left message with wife and requested return call to [facility] at [phone].     Natalia More RN  [facility]: [place]   Right ear hearing screen completed date: 2024  Right ear screen method: EOAE (evoked otoacoustic emission)  Right ear screen result: Passed  Right ear screen comment: N/A    Left ear hearing screen completed date: 2024  Left ear screen method: EOAE (evoked otoacoustic emission)  Left ear screen result: Passed  Left ear screen comments: N/A

## 2024-01-20 DIAGNOSIS — F07.81 POST CONCUSSION SYNDROME: ICD-10-CM

## 2024-01-20 DIAGNOSIS — I10 HYPERTENSION GOAL BP (BLOOD PRESSURE) < 140/90: ICD-10-CM

## 2024-01-20 DIAGNOSIS — E78.5 DYSLIPIDEMIA (HIGH LDL; LOW HDL): ICD-10-CM

## 2024-01-20 DIAGNOSIS — S06.9X0S TRAUMATIC BRAIN INJURY, WITHOUT LOSS OF CONSCIOUSNESS, SEQUELA (H): ICD-10-CM

## 2024-01-22 RX ORDER — VERAPAMIL HYDROCHLORIDE 240 MG/1
240 TABLET, FILM COATED, EXTENDED RELEASE ORAL DAILY
Qty: 90 TABLET | Refills: 0 | Status: SHIPPED | OUTPATIENT
Start: 2024-01-22 | End: 2024-04-06

## 2024-01-22 RX ORDER — METOPROLOL SUCCINATE 100 MG/1
100 TABLET, EXTENDED RELEASE ORAL DAILY
Qty: 90 TABLET | Refills: 0 | Status: SHIPPED | OUTPATIENT
Start: 2024-01-22 | End: 2024-04-06

## 2024-01-23 DIAGNOSIS — G47.33 OSA (OBSTRUCTIVE SLEEP APNEA): Chronic | ICD-10-CM

## 2024-01-23 DIAGNOSIS — G47.33 OBSTRUCTIVE SLEEP APNEA (ADULT) (PEDIATRIC): Primary | ICD-10-CM

## 2024-01-23 RX ORDER — CYCLOBENZAPRINE HCL 10 MG
10 TABLET ORAL 3 TIMES DAILY PRN
Qty: 90 TABLET | Refills: 0 | Status: SHIPPED | OUTPATIENT
Start: 2024-01-23 | End: 2024-04-06

## 2024-01-23 RX ORDER — ATORVASTATIN CALCIUM 10 MG/1
10 TABLET, FILM COATED ORAL DAILY
Qty: 90 TABLET | Refills: 0 | Status: SHIPPED | OUTPATIENT
Start: 2024-01-23 | End: 2024-04-06

## 2024-01-23 NOTE — TELEPHONE ENCOUNTER
Please call patient, signed refill but needs follow up for next refill. Advise to schedule annual if due, or med recheck/follow up appointment.

## 2024-01-24 NOTE — TELEPHONE ENCOUNTER
Called patient and his current health insurance is changing at the end of the month.  Once he gets new insurance information he will call back and schedule an appointment.    JOSE ANGEL Lai  [facility]

## 2024-02-15 ENCOUNTER — OFFICE VISIT (OUTPATIENT)
Dept: FAMILY MEDICINE | Facility: CLINIC | Age: 59
End: 2024-02-15
Payer: COMMERCIAL

## 2024-02-15 VITALS
OXYGEN SATURATION: 96 % | SYSTOLIC BLOOD PRESSURE: 160 MMHG | WEIGHT: 238.8 LBS | HEART RATE: 67 BPM | DIASTOLIC BLOOD PRESSURE: 92 MMHG | BODY MASS INDEX: 31.65 KG/M2 | HEIGHT: 73 IN | TEMPERATURE: 98.7 F | RESPIRATION RATE: 20 BRPM

## 2024-02-15 DIAGNOSIS — J01.90 ACUTE SINUSITIS WITH SYMPTOMS > 10 DAYS: ICD-10-CM

## 2024-02-15 DIAGNOSIS — R09.81 NASAL CONGESTION: Primary | ICD-10-CM

## 2024-02-15 DIAGNOSIS — I10 ESSENTIAL HYPERTENSION: Chronic | ICD-10-CM

## 2024-02-15 PROCEDURE — 99214 OFFICE O/P EST MOD 30 MIN: CPT

## 2024-02-15 RX ORDER — COVID-19 VACCINE, MRNA 50 UG/.5ML
INJECTION, SUSPENSION INTRAMUSCULAR
COMMUNITY
Start: 2023-09-18 | End: 2024-02-15

## 2024-02-15 RX ORDER — GUAIFENESIN 1200 MG/1
1200 TABLET, EXTENDED RELEASE ORAL 2 TIMES DAILY
Qty: 14 TABLET | Refills: 0 | Status: SHIPPED | OUTPATIENT
Start: 2024-02-15 | End: 2024-02-22

## 2024-02-15 RX ORDER — INFLUENZA VIRUS VACCINE 15; 15; 15; 15 UG/.5ML; UG/.5ML; UG/.5ML; UG/.5ML
SUSPENSION INTRAMUSCULAR
COMMUNITY
Start: 2023-09-18 | End: 2024-02-15

## 2024-02-15 RX ORDER — ZOSTER VACCINE RECOMBINANT, ADJUVANTED 50 MCG/0.5
KIT INTRAMUSCULAR
COMMUNITY
Start: 2023-09-18 | End: 2024-02-15

## 2024-02-15 NOTE — PROGRESS NOTES
"Assessment & Plan       Nasal congestion  - guaiFENesin 1200 MG TB12  Dispense: 14 tablet; Refill: 0    Acute sinusitis with symptoms > 10 days  Symptoms ongoign 14 days, started improving but then worsened again is consistent with secondary bacterial infection. Will treat with 1st line abx and discussed OTC symptom management. Offered pt note to miss work tomorrow/past dates missed, pt declined, he will message via [employer] if needing note.  - guaiFENesin 1200 MG TB12  Dispense: 14 tablet; Refill: 0  - amoxicillin-clavulanate (AUGMENTIN) 875-125 MG tablet  Dispense: 14 tablet; Refill: 0    Essential hypertension   Chronic, BP elevated today, pt notes his readings at home are usually around 120/70, he has significant health anxiety. He will check BP at home and message with results.       BMI  Estimated body mass index is 31.94 kg/m  as calculated from the following:    Height as of this encounter: 1.842 m (6' 0.5\").    Weight as of this encounter: 108.3 kg (238 lb 12.8 oz).         Follow up evisit if not improving by Sunday.   Follow up in person if not improving near baseline by day 7.   Follow up for preventative visit, due for labs.   Pt insurance changed - he no longer has prescription drug coverage. will check in with insurance on preventative/labs.     Fede Menendez is a 58 year old, presenting for the following health issues:  Pharyngitis (/) and Sinus Problem (/)        2/15/2024     2:11 PM   Additional Questions   Roomed by Shaun MITCHELL MA         2/15/2024     2:11 PM   Patient Reported Additional Medications   Patient reports taking the following new medications Patient has been weaning off lexapro for the last month, breaking them into a half, and will try to get off them.     History of Present Illness       Reason for visit:  Cold, sore throat, Sinus infection  Symptom onset:  1-2 weeks ago  Symptoms include:  Congestion, hurts to breathe, phlegm  Symptom intensity:  Severe  Symptom progression:  " Worsening  Had these symptoms before:  Yes  Has tried/received treatment for these symptoms:  No  What makes it worse:  None  What makes it better:  Hot showers, steam seems to help       Blood pressures at home 128/62.  Home covid test negative.       Acute Illness  Acute illness concerns: Patient is concerned about sinus infection, hurts to breathe, gets congested at night, morning shower it helped with phlegm.     Onset/Duration: Two weeks at work, started feeling Friday, Sunday hit, two weeks ago. Missed a couple of days of work.   Off Mon, Wed, Friday last week.     Would like work letter.     Symptoms:  Fever: YES. Feeling flushed  Chills/Sweats: YES- sweaty   Headache (location?): YES, sometimes severe  Sinus Pressure: YES, behind the eyes  Conjunctivitis:  No  Ear Pain: YES- especially right ear, try to equalize pressure, squishy in right ear, meniere's disease. Recently had 1 episode   Rhinorrhea: YES  Congestion: YES- at night  Sore Throat: YES  Cough: no  Wheeze: No  Decreased Appetite: YES, not severe  Nausea: No  Vomiting: No  Diarrhea: No  Dysuria/Freq.: No  Dysuria or Hematuria: No  Fatigue/Achiness: YES  Sick/Strep Exposure: wife was exposed to strep coworker, she does not have symptoms, coworkers may have had exposure.   Therapies tried and outcome: CPAP, tylenol PM once    Still able to take all medications ok and keep them down.     Wt Readings from Last 5 Encounters:   02/15/24 108.3 kg (238 lb 12.8 oz)   01/11/23 107 kg (236 lb)   07/07/22 113.9 kg (251 lb 0.8 oz)   03/07/22 106.6 kg (235 lb)   11/22/21 108.9 kg (240 lb)      BP Readings from Last 6 Encounters:   02/15/24 (!) 160/92   07/24/23 129/79   01/11/23 (!) 148/101   12/07/22 (!) 159/89   07/07/22 (!) 154/92   10/01/21 127/82          Current Outpatient Medications   Medication Instructions    ALPRAZolam (XANAX) 0.5 MG tablet TAKE ONE-HALF TO ONE TABLET BY MOUTH AS NEEDED FOR PANIC ATTACKS    aspirin (ASA) 81 mg, Oral, DAILY     "atorvastatin (LIPITOR) 10 mg, Oral, DAILY    cyclobenzaprine (FLEXERIL) 10 mg, Oral, 3 TIMES DAILY PRN    escitalopram (LEXAPRO) 20 mg, Oral    fish oil-omega-3 fatty acids 1 g, Oral, DAILY    ketoconazole (NIZORAL) 2 % external cream Apply thin layer to affected areas daily    LORATADINE 10 MG OR TABS ONE DAILY    MAGNESIUM PO Pt uses a supplement as needed. When they have the medication on hand, they use it daily.    meclizine (ANTIVERT) 25 mg, Oral, 3 TIMES DAILY PRN    metoprolol succinate ER (TOPROL XL) 100 mg, Oral, DAILY    MULTI-VITAMIN PO TABS 1 TABLET DAILY    nortriptyline (PAMELOR) 50 mg, Oral, AT BEDTIME, Needs an office appointment    ondansetron (ZOFRAN ODT) 4 mg, Oral, EVERY 8 HOURS PRN    verapamil ER (CALAN-SR) 240 mg, Oral, DAILY    VITAMIN D PO 5000 IU daily          Review of Systems  Constitutional, HEENT, cardiovascular, pulmonary, gi and gu systems are negative, except as otherwise noted.      Objective    BP (!) 160/92 (BP Location: Left arm, Patient Position: Sitting, Cuff Size: Adult Large)   Pulse 67   Temp 98.7  F (37.1  C) (Oral)   Resp 20   Ht 1.842 m (6' 0.5\")   Wt 108.3 kg (238 lb 12.8 oz)   SpO2 96%   BMI 31.94 kg/m    Body mass index is 31.94 kg/m .  Physical Exam   GENERAL: alert and no distress  EYES: Eyes grossly normal to inspection, PERRL and conjunctivae and sclerae normal  HENT: ear canals and TM's with serous effusions, mild erythema, otherwise normal, nose and mouth without ulcers or lesions other than moderate erythema.   NECK: no adenopathy, no asymmetry, masses, or scars  RESP: lungs clear to auscultation - no rales, rhonchi or wheezes  CV: regular rate and rhythm, normal S1 S2, no S3 or S4, no murmur, click or rub, no peripheral edema  MS: no gross musculoskeletal defects noted, no edema  NEURO: Normal strength and tone, mentation intact and speech normal    No results found for this or any previous visit (from the past 24 hour(s)).        Signed Electronically " by: BONNIE Pineda CNP

## 2024-02-15 NOTE — PATIENT INSTRUCTIONS
[facility]  Send me a [employer] message with your blood pressure readings.   Check on preventative coverage - you are due for preventative visit.   - schedule preventative visit, we can discuss lab orders at that appointment and then take list to your insurance to check coverage before getting labs drawn    Submit e-visit on Sunday night/Monday morning if not starting to improve (3 days since starting antibiotic).   - we will switch to different antibiotic    If not feeling significant improvement by day 7, let me know and we will consider extending the duration of antibiotics       OVER THE COUNTER MEDs and COMFORT MEASURES  NON-Medications  Push hydration (you should be annoyed about peeing frequently)  Hot water + honey, sip on throughout the day  Cool mist humidifier, at bedside   Steam shower for 10-15 minutes 2-3 times per day for comfort.  Nasal saline spray, ok to use as much as every 1 hour    If you take any herbal supplements make sure they are from a reputable company with 3rd party testing.       Mucinex (guaifenesin) 1200 mg twice per day - increase hydration/water intake, this helps keep mucus/phlegm thin so you can clear it more easily. Take for 5-7 days.     Acetaminophen  (aka Tylenol) 1000 mg up to 3 x per day   [Fever, muscle aches, head/sinus pain, chest tightness/soreness]  (Do not take if you have liver disease, avoid alcohol while taking).  I recommend alternating acetaminophen / ibuprofen every 4 hours    Ibuprofen (aka motrin, Advil) 200-800 mg up to 3 x per day  [Fever, inflammation, congestion, pain/soreness].  (Do not take if you have kidney disease).   If you have heartburn/reflux, take blood thinners, or take a selective serotonin reuptake inhibitor, you should take over the counter famotidine (pepcid) or pantoprazole 20 mg daily (if already taking can increase dose) while you are taking ibuprofen regularly to prevent worsening.      Fluticasone (flonase) nasal spray one  time daily (especially for ear pain)  OK to continue other over the counter allergy meds    Acute Sinusitis: Care Instructions  Overview     Acute sinusitis is an inflammation of the mucous membranes inside the nose and sinuses. Sinuses are the hollow spaces in your skull around the eyes and nose. Acute sinusitis often follows a cold. Acute sinusitis causes thick, discolored mucus that drains from the nose or down the back of the throat. It also can cause pain and pressure in your head and face along with a stuffy or blocked nose.  In most cases, sinusitis gets better on its own in 1 to 2 weeks. But some mild symptoms may last for several weeks. Sometimes antibiotics are needed if there is a bacterial infection.  Follow-up care is a key part of your treatment and safety. Be sure to make and go to all appointments, and call your doctor if you are having problems. It's also a good idea to know your test results and keep a list of the medicines you take.  How can you care for yourself at home?  Use saline (saltwater) nasal washes. This can help keep your nasal passages open and wash out mucus and allergens.  You can buy saline nose washes at a grocery store or drugstore. Follow the instructions on the package.  You can make your own at home. Add 1 teaspoon of non-iodized salt and 1 teaspoon of baking soda to 2 cups of distilled or boiled and cooled water. Fill a squeeze bottle or a nasal cleansing pot (such as a neti pot) with the nasal wash. Then put the tip into your nostril, and lean over the sink. With your mouth open, gently squirt the liquid. Repeat on the other side.  Try a decongestant nasal spray like oxymetazoline (Afrin). Do not use it for more than 3 days in a row. Using it for more than 3 days can make your congestion worse.  If needed, take an over-the-counter pain medicine, such as acetaminophen (Tylenol), ibuprofen (Advil, Motrin), or naproxen (Aleve). Read and follow all instructions on the label.  If  "the doctor prescribed antibiotics, take them as directed. Do not stop taking them just because you feel better. You need to take the full course of antibiotics.  Be careful when taking over-the-counter cold or flu medicines and Tylenol at the same time. Many of these medicines have acetaminophen, which is Tylenol. Read the labels to make sure that you are not taking more than the recommended dose. Too much acetaminophen (Tylenol) can be harmful.  Try a steroid nasal spray. It may help with your symptoms.  Breathe warm, moist air. You can use a steamy shower, a hot bath, or a sink filled with hot water. Avoid cold, dry air. Using a humidifier in your home may help. Follow the directions for cleaning the machine.  When should you call for help?   Call your doctor now or seek immediate medical care if:    You have new or worse swelling, redness, or pain in your face or around one or both of your eyes.     You have double vision or a change in your vision.     You have a high fever.     You have a severe headache and a stiff neck.     You have mental changes, such as feeling confused or much less alert.   Watch closely for changes in your health, and be sure to contact your doctor if:    You are not getting better as expected.   Where can you learn more?  Go to https://www.Buxfer.net/patiented  Enter I933 in the search box to learn more about \"Acute Sinusitis: Care Instructions.\"  Current as of: February 28, 2023               Content Version: 13.8    7188-7729 [employer].   Care instructions adapted under license by your healthcare professional. If you have questions about a medical condition or this instruction, always ask your healthcare professional. [employer] disclaims any warranty or liability for your use of this information.      "

## 2024-02-19 ENCOUNTER — MYC MEDICAL ADVICE (OUTPATIENT)
Dept: FAMILY MEDICINE | Facility: CLINIC | Age: 59
End: 2024-02-19
Payer: COMMERCIAL

## 2024-02-20 NOTE — TELEPHONE ENCOUNTER
RN replied to patient via [email or web address]. See message for details.     Jose Vail RN, BSN, PHN  [facility]: [place]

## 2024-04-06 ENCOUNTER — MYC REFILL (OUTPATIENT)
Dept: FAMILY MEDICINE | Facility: CLINIC | Age: 59
End: 2024-04-06
Payer: COMMERCIAL

## 2024-04-06 DIAGNOSIS — S06.9X0S TRAUMATIC BRAIN INJURY, WITHOUT LOSS OF CONSCIOUSNESS, SEQUELA (H): ICD-10-CM

## 2024-04-06 DIAGNOSIS — I10 HYPERTENSION GOAL BP (BLOOD PRESSURE) < 140/90: ICD-10-CM

## 2024-04-06 DIAGNOSIS — F07.81 POST CONCUSSION SYNDROME: ICD-10-CM

## 2024-04-06 DIAGNOSIS — R51.9 NONINTRACTABLE EPISODIC HEADACHE, UNSPECIFIED HEADACHE TYPE: ICD-10-CM

## 2024-04-06 DIAGNOSIS — E78.5 DYSLIPIDEMIA (HIGH LDL; LOW HDL): ICD-10-CM

## 2024-04-06 DIAGNOSIS — H81.01 MENIERE'S DISEASE, RIGHT: ICD-10-CM

## 2024-04-08 RX ORDER — ONDANSETRON 4 MG/1
4 TABLET, ORALLY DISINTEGRATING ORAL EVERY 8 HOURS PRN
Qty: 20 TABLET | Refills: 0 | Status: SHIPPED | OUTPATIENT
Start: 2024-04-08

## 2024-04-09 RX ORDER — VERAPAMIL HYDROCHLORIDE 240 MG/1
240 TABLET, FILM COATED, EXTENDED RELEASE ORAL DAILY
Qty: 90 TABLET | Refills: 1 | Status: SHIPPED | OUTPATIENT
Start: 2024-04-09

## 2024-04-09 RX ORDER — NORTRIPTYLINE HYDROCHLORIDE 50 MG/1
50 CAPSULE ORAL AT BEDTIME
Qty: 90 CAPSULE | Refills: 1 | Status: SHIPPED | OUTPATIENT
Start: 2024-04-09

## 2024-04-09 RX ORDER — CYCLOBENZAPRINE HCL 10 MG
10 TABLET ORAL 3 TIMES DAILY PRN
Qty: 90 TABLET | Refills: 1 | Status: SHIPPED | OUTPATIENT
Start: 2024-04-09

## 2024-04-09 RX ORDER — ATORVASTATIN CALCIUM 10 MG/1
10 TABLET, FILM COATED ORAL DAILY
Qty: 90 TABLET | Refills: 1 | Status: SHIPPED | OUTPATIENT
Start: 2024-04-09

## 2024-04-09 RX ORDER — METOPROLOL SUCCINATE 100 MG/1
100 TABLET, EXTENDED RELEASE ORAL DAILY
Qty: 90 TABLET | Refills: 1 | Status: SHIPPED | OUTPATIENT
Start: 2024-04-09

## 2024-08-04 ENCOUNTER — HEALTH MAINTENANCE LETTER (OUTPATIENT)
Age: 59
End: 2024-08-04

## 2024-10-11 DIAGNOSIS — S06.9X0S TRAUMATIC BRAIN INJURY, WITHOUT LOSS OF CONSCIOUSNESS, SEQUELA (H): ICD-10-CM

## 2024-10-11 DIAGNOSIS — I10 HYPERTENSION GOAL BP (BLOOD PRESSURE) < 140/90: ICD-10-CM

## 2024-10-11 DIAGNOSIS — E78.5 DYSLIPIDEMIA (HIGH LDL; LOW HDL): ICD-10-CM

## 2024-10-11 DIAGNOSIS — F07.81 POST CONCUSSION SYNDROME: ICD-10-CM

## 2024-10-11 DIAGNOSIS — R51.9 NONINTRACTABLE EPISODIC HEADACHE, UNSPECIFIED HEADACHE TYPE: ICD-10-CM

## 2024-10-11 RX ORDER — NORTRIPTYLINE HYDROCHLORIDE 50 MG/1
50 CAPSULE ORAL AT BEDTIME
Qty: 90 CAPSULE | Refills: 0 | Status: SHIPPED | OUTPATIENT
Start: 2024-10-11 | End: 2024-11-04

## 2024-10-16 RX ORDER — VERAPAMIL HYDROCHLORIDE 240 MG/1
240 TABLET, FILM COATED, EXTENDED RELEASE ORAL DAILY
Qty: 90 TABLET | Refills: 0 | Status: SHIPPED | OUTPATIENT
Start: 2024-10-16 | End: 2024-11-04

## 2024-10-16 RX ORDER — METOPROLOL SUCCINATE 100 MG/1
100 TABLET, EXTENDED RELEASE ORAL DAILY
Qty: 90 TABLET | Refills: 0 | Status: SHIPPED | OUTPATIENT
Start: 2024-10-16 | End: 2024-11-04

## 2024-10-16 RX ORDER — ATORVASTATIN CALCIUM 10 MG/1
10 TABLET, FILM COATED ORAL DAILY
Qty: 90 TABLET | Refills: 0 | Status: SHIPPED | OUTPATIENT
Start: 2024-10-16

## 2024-10-17 NOTE — TELEPHONE ENCOUNTER
Called patient and left a voicemail message to call the clinic and schedule an Annual physical/ labs appointment.      Anita Barney

## 2024-10-30 SDOH — HEALTH STABILITY: PHYSICAL HEALTH: ON AVERAGE, HOW MANY MINUTES DO YOU ENGAGE IN EXERCISE AT THIS LEVEL?: 60 MIN

## 2024-10-30 SDOH — HEALTH STABILITY: PHYSICAL HEALTH: ON AVERAGE, HOW MANY DAYS PER WEEK DO YOU ENGAGE IN MODERATE TO STRENUOUS EXERCISE (LIKE A BRISK WALK)?: 5 DAYS

## 2024-10-30 ASSESSMENT — SOCIAL DETERMINANTS OF HEALTH (SDOH): HOW OFTEN DO YOU GET TOGETHER WITH FRIENDS OR RELATIVES?: NEVER

## 2024-11-04 ENCOUNTER — OFFICE VISIT (OUTPATIENT)
Dept: FAMILY MEDICINE | Facility: CLINIC | Age: 59
End: 2024-11-04
Payer: COMMERCIAL

## 2024-11-04 VITALS
DIASTOLIC BLOOD PRESSURE: 83 MMHG | HEIGHT: 73 IN | RESPIRATION RATE: 12 BRPM | OXYGEN SATURATION: 95 % | SYSTOLIC BLOOD PRESSURE: 118 MMHG | WEIGHT: 214.4 LBS | TEMPERATURE: 98.4 F | HEART RATE: 82 BPM | BODY MASS INDEX: 28.41 KG/M2

## 2024-11-04 DIAGNOSIS — E66.09 CLASS 1 OBESITY DUE TO EXCESS CALORIES WITHOUT SERIOUS COMORBIDITY WITH BODY MASS INDEX (BMI) OF 33.0 TO 33.9 IN ADULT: Chronic | ICD-10-CM

## 2024-11-04 DIAGNOSIS — I10 ESSENTIAL HYPERTENSION: ICD-10-CM

## 2024-11-04 DIAGNOSIS — Z00.00 ROUTINE GENERAL MEDICAL EXAMINATION AT A HEALTH CARE FACILITY: Primary | ICD-10-CM

## 2024-11-04 DIAGNOSIS — E66.811 CLASS 1 OBESITY DUE TO EXCESS CALORIES WITHOUT SERIOUS COMORBIDITY WITH BODY MASS INDEX (BMI) OF 33.0 TO 33.9 IN ADULT: Chronic | ICD-10-CM

## 2024-11-04 DIAGNOSIS — E78.5 DYSLIPIDEMIA (HIGH LDL; LOW HDL): ICD-10-CM

## 2024-11-04 DIAGNOSIS — S06.9X0S TRAUMATIC BRAIN INJURY, WITHOUT LOSS OF CONSCIOUSNESS, SEQUELA (H): ICD-10-CM

## 2024-11-04 DIAGNOSIS — M54.50 CHRONIC BILATERAL LOW BACK PAIN, UNSPECIFIED WHETHER SCIATICA PRESENT: Chronic | ICD-10-CM

## 2024-11-04 DIAGNOSIS — G89.29 CHRONIC BILATERAL LOW BACK PAIN, UNSPECIFIED WHETHER SCIATICA PRESENT: Chronic | ICD-10-CM

## 2024-11-04 DIAGNOSIS — L21.9 SEBORRHEIC DERMATITIS: ICD-10-CM

## 2024-11-04 LAB
ALBUMIN SERPL BCG-MCNC: 4.5 G/DL (ref 3.5–5.2)
ALP SERPL-CCNC: 106 U/L (ref 40–150)
ALT SERPL W P-5'-P-CCNC: 29 U/L (ref 0–70)
ANION GAP SERPL CALCULATED.3IONS-SCNC: 12 MMOL/L (ref 7–15)
AST SERPL W P-5'-P-CCNC: 29 U/L (ref 0–45)
BILIRUB SERPL-MCNC: 1 MG/DL
BUN SERPL-MCNC: 11.3 MG/DL (ref 8–23)
CALCIUM SERPL-MCNC: 10 MG/DL (ref 8.8–10.4)
CHLORIDE SERPL-SCNC: 105 MMOL/L (ref 98–107)
CHOLEST SERPL-MCNC: 147 MG/DL
CREAT SERPL-MCNC: 1.01 MG/DL (ref 0.67–1.17)
EGFRCR SERPLBLD CKD-EPI 2021: 86 ML/MIN/1.73M2
EST. AVERAGE GLUCOSE BLD GHB EST-MCNC: 114 MG/DL
FASTING STATUS PATIENT QL REPORTED: YES
FASTING STATUS PATIENT QL REPORTED: YES
GLUCOSE SERPL-MCNC: 88 MG/DL (ref 70–99)
HBA1C MFR BLD: 5.6 % (ref 0–5.6)
HCO3 SERPL-SCNC: 24 MMOL/L (ref 22–29)
HDLC SERPL-MCNC: 45 MG/DL
LDLC SERPL CALC-MCNC: 70 MG/DL
NONHDLC SERPL-MCNC: 102 MG/DL
POTASSIUM SERPL-SCNC: 4.1 MMOL/L (ref 3.4–5.3)
PROT SERPL-MCNC: 8.3 G/DL (ref 6.4–8.3)
SODIUM SERPL-SCNC: 141 MMOL/L (ref 135–145)
TRIGL SERPL-MCNC: 158 MG/DL
VIT D+METAB SERPL-MCNC: 120 NG/ML (ref 20–50)

## 2024-11-04 PROCEDURE — 80061 LIPID PANEL: CPT

## 2024-11-04 PROCEDURE — 99396 PREV VISIT EST AGE 40-64: CPT

## 2024-11-04 PROCEDURE — 82306 VITAMIN D 25 HYDROXY: CPT

## 2024-11-04 PROCEDURE — 80053 COMPREHEN METABOLIC PANEL: CPT

## 2024-11-04 PROCEDURE — 99214 OFFICE O/P EST MOD 30 MIN: CPT | Mod: 25

## 2024-11-04 PROCEDURE — 36415 COLL VENOUS BLD VENIPUNCTURE: CPT

## 2024-11-04 PROCEDURE — 83036 HEMOGLOBIN GLYCOSYLATED A1C: CPT

## 2024-11-04 RX ORDER — METOPROLOL SUCCINATE 50 MG/1
50 TABLET, EXTENDED RELEASE ORAL DAILY
Qty: 90 TABLET | Refills: 0 | Status: SHIPPED | OUTPATIENT
Start: 2024-11-04

## 2024-11-04 RX ORDER — NORTRIPTYLINE HYDROCHLORIDE 50 MG/1
50 CAPSULE ORAL AT BEDTIME
Qty: 90 CAPSULE | Refills: 1 | Status: SHIPPED | OUTPATIENT
Start: 2024-11-04

## 2024-11-04 RX ORDER — KETOCONAZOLE 20 MG/G
CREAM TOPICAL
Qty: 30 G | Refills: 1 | Status: SHIPPED | OUTPATIENT
Start: 2024-11-04

## 2024-11-04 RX ORDER — VERAPAMIL HYDROCHLORIDE 240 MG/1
240 TABLET, FILM COATED, EXTENDED RELEASE ORAL DAILY
Qty: 90 TABLET | Refills: 1 | Status: SHIPPED | OUTPATIENT
Start: 2024-11-04

## 2024-11-04 ASSESSMENT — PATIENT HEALTH QUESTIONNAIRE - PHQ9
10. IF YOU CHECKED OFF ANY PROBLEMS, HOW DIFFICULT HAVE THESE PROBLEMS MADE IT FOR YOU TO DO YOUR WORK, TAKE CARE OF THINGS AT HOME, OR GET ALONG WITH OTHER PEOPLE: NOT DIFFICULT AT ALL
SUM OF ALL RESPONSES TO PHQ QUESTIONS 1-9: 1
SUM OF ALL RESPONSES TO PHQ QUESTIONS 1-9: 1

## 2024-11-04 NOTE — PATIENT INSTRUCTIONS
Blood Pressure  B from 100 to 50 MG daily - ok to start this when you get your next refill.   Continue verapamil.     Cholesterol  Continue statin until we get lab results, if they are improved we can reduce dose to every other day or consider stopping - if we do this I want you to get lab recheck in 3 months given your family history.    Plan to follow up with labs in 3-6 months      Schedule appointment with neuropsychiatrist (Dr. Bonilla)  - Discuss xanax (alprazolam), daily use  - My recommendation would be to restart lexapro at 5-10 mg and reduce xanax use.    Call your insurance company - do they cover hep B vaccine.     Patient Education     Next colon cancer screening - 2026 (last done in fall 2023, normal/negative)  Preventive Care Advice   This is general advice given by our system to help you stay healthy. However, your care team may have specific advice just for you. Please talk to your care team about your preventive care needs.  Nutrition  Eat 5 or more servings of fruits and vegetables each day.  Try wheat bread, brown rice and whole grain pasta (instead of white bread, rice, and pasta).  Get enough calcium and vitamin D. Check the label on foods and aim for 100% of the RDA (recommended daily allowance).  Lifestyle  Exercise at least 150 minutes each week  (30 minutes a day, 5 days a week).  Do muscle strengthening activities 2 days a week. These help control your weight and prevent disease.  No smoking.  Wear sunscreen to prevent skin cancer.  Have a dental exam and cleaning every 6 months.  Yearly exams  See your health care team every year to talk about:  Any changes in your health.  Any medicines your care team has prescribed.  Preventive care, family planning, and ways to prevent chronic diseases.  Shots (vaccines)   HPV shots (up to age 26), if you've never had them before.  Hepatitis B shots (up to age 59), if you've never had them before.  COVID-19 shot: Get this shot when it's due.  Flu shot:  Get a flu shot every year.  Tetanus shot: Get a tetanus shot every 10 years.  Pneumococcal, hepatitis A, and RSV shots: Ask your care team if you need these based on your risk.  Shingles shot (for age 50 and up)  General health tests  Diabetes screening:  Starting at age 35, Get screened for diabetes at least every 3 years.  If you are younger than age 35, ask your care team if you should be screened for diabetes.  Cholesterol test: At age 39, start having a cholesterol test every 5 years, or more often if advised.  Bone density scan (DEXA): At age 50, ask your care team if you should have this scan for osteoporosis (brittle bones).  Hepatitis C: Get tested at least once in your life.  STIs (sexually transmitted infections)  Before age 24: Ask your care team if you should be screened for STIs.  After age 24: Get screened for STIs if you're at risk. You are at risk for STIs (including HIV) if:  You are sexually active with more than one person.  You don't use condoms every time.  You or a partner was diagnosed with a sexually transmitted infection.  If you are at risk for HIV, ask about PrEP medicine to prevent HIV.  Get tested for HIV at least once in your life, whether you are at risk for HIV or not.  Cancer screening tests  Cervical cancer screening: If you have a cervix, begin getting regular cervical cancer screening tests starting at age 21.  Breast cancer scan (mammogram): If you've ever had breasts, begin having regular mammograms starting at age 40. This is a scan to check for breast cancer.  Colon cancer screening: It is important to start screening for colon cancer at age 45.  Have a colonoscopy test every 10 years (or more often if you're at risk) Or, ask your provider about stool tests like a FIT test every year or Cologuard test every 3 years.  To learn more about your testing options, visit:   .  For help making a decision, visit:   https://bit.ly/jf68276.  Prostate cancer screening test: If you have a  prostate, ask your care team if a prostate cancer screening test (PSA) at age 55 is right for you.  Lung cancer screening: If you are a current or former smoker ages 50 to 80, ask your care team if ongoing lung cancer screenings are right for you.  For informational purposes only. Not to replace the advice of your health care provider. Copyright   2023 [facility]. All rights reserved. Clinically reviewed by the  [employer] [facility]. [employer] 757525 - REV 01/24.  Substance Use Disorder: Care Instructions  Overview     You can improve your life and health by stopping your use of alcohol or drugs. When you don't drink or use drugs, you may feel and sleep better. You may get along better with your family, friends, and coworkers. There are medicines and programs that can help with substance use disorder.  How can you care for yourself at home?  Here are some ways to help you stay sober and prevent relapse.  If you have been given medicine to help keep you sober or reduce your cravings, be sure to take it exactly as prescribed.  Talk to your doctor about programs that can help you stop using drugs or drinking alcohol.  Do not keep alcohol or drugs in your home.  Plan ahead. Think about what you'll say if other people ask you to drink or use drugs. Try not to spend time with people who drink or use drugs.  Use the time and money spent on drinking or drugs to do something that's important to you.  Preventing a relapse  Have a plan to deal with relapse. Learn to recognize changes in your thinking that lead you to drink or use drugs. Get help before you start to drink or use drugs again.  Try to stay away from situations, friends, or places that may lead you to drink or use drugs.  If you feel the need to drink alcohol or use drugs again, seek help right away. Call a trusted friend or family member. Some people get support from organizations such as Narcotics Anonymous or [employer] or  from treatment facilities.  If you relapse, get help as soon as you can. Some people make a plan with another person that outlines what they want that person to do for them if they relapse. The plan usually includes how to handle the relapse and who to notify in case of relapse.  Don't give up. Remember that a relapse doesn't mean that you have failed. Use the experience to learn the triggers that lead you to drink or use drugs. Then quit again. Recovery is a lifelong process. Many people have several relapses before they are able to quit for good.  Follow-up care is a key part of your treatment and safety. Be sure to make and go to all appointments, and call your doctor if you are having problems. It's also a good idea to know your test results and keep a list of the medicines you take.  When should you call for help?   Call 501  anytime you think you may need emergency care. For example, call if you or someone else:    Has overdosed or has withdrawal signs. Be sure to tell the emergency workers that you are or someone else is using or trying to quit using drugs. Overdose or withdrawal signs may include:  Losing consciousness.  Seizure.  Seeing or hearing things that aren't there (hallucinations).     Is thinking or talking about suicide or harming others.   Where to get help 24 hours a day, 7 days a week   If you or someone you know talks about suicide, self-harm, a mental health crisis, a substance use crisis, or any other kind of emotional distress, get help right away. You can:    Call the Suicide and Crisis Lifeline at 988.     Call 7-143-615-TALK (1-771.250.2837).     Text HOME to 202163 to access the Crisis Text Line.   Consider saving these numbers in your phone.  Go to [employer] for more information or to chat online.  Call your doctor now or seek immediate medical care if:    You are having withdrawal symptoms. These may include nausea or vomiting, sweating, shakiness, and anxiety.   Watch closely  "for changes in your health, and be sure to contact your doctor if:    You have a relapse.     You need more help or support to stop.   Where can you learn more?  Go to https://www.[employer].net/patiented  Enter H573 in the search box to learn more about \"Substance Use Disorder: Care Instructions.\"  Current as of: November 15, 2023  Content Version: 14.2 2024 [employer].   Care instructions adapted under license by your healthcare professional. If you have questions about a medical condition or this instruction, always ask your healthcare professional. Healthwise[employer] Incorporated disclaims any warranty or liability for your use of this information.    Eating Healthy Foods: Care Instructions  With every meal, you can make healthy food choices. Try to eat a variety of fruits, vegetables, whole grains, lean proteins, and low-fat dairy products. This can help you get the right balance of nutrients, including vitamins and minerals. Small changes add up over time. You can start by adding one healthy food to your meals each day.    Try to make half your plate fruits and vegetables, one-fourth whole grains, and one-fourth lean proteins. Try including dairy with your meals.   Eat more fruits and vegetables. Try to have them with most meals and snacks.   Foods for healthy eating        Fruits   These can be fresh, frozen, canned, or dried.  Try to choose whole fruit rather than fruit juice.  Eat a variety of colors.        Vegetables   These can be fresh, frozen, canned, or dried.  Beans, peas, and lentils count too.        Whole grains   Choose whole-grain breads, cereals, and noodles.  Try brown rice.        Lean proteins   These can include lean meat, poultry, fish, and eggs.  You can also have tofu, beans, peas, lentils, nuts, and seeds.        Dairy   Try milk, yogurt, and cheese.  Choose low-fat or fat-free when you can.  If you need to, use lactose-free milk or fortified plant-based milk products, such as " "soy milk.        Water   Drink water when you're thirsty.  Limit sugar-sweetened drinks, including soda, fruit drinks, and sports drinks.  Where can you learn more?  Go to https://www.[employer].net/patiented  Enter T756 in the search box to learn more about \"Eating Healthy Foods: Care Instructions.\"  Current as of: September 20, 2023  Content Version: 14.2 2024 [employer] [facility] [place].   Care instructions adapted under license by your healthcare professional. If you have questions about a medical condition or this instruction, always ask your healthcare professional. Healthwise, Incorporated disclaims any warranty or liability for your use of this information.    Learning About Being Physically Active  What is physical activity?     Being physically active means doing any kind of activity that gets your body moving.  The types of physical activity that can help you get fit and stay healthy include:  Aerobic or \"cardio\" activities. These make your heart beat faster and make you breathe harder, such as brisk walking, riding a bike, or running. They strengthen your heart and lungs and build up your endurance.  Strength training activities. These make your muscles work against, or \"resist,\" something. Examples include lifting weights or doing push-ups. These activities help tone and strengthen your muscles and bones.  Stretches. These let you move your joints and muscles through their full range of motion. Stretching helps you be more flexible.  Reaching a balance between these three types of physical activity is important because each one contributes to your overall fitness.  What are the benefits of being active?  Being active is one of the best things you can do for your health. It helps you to:  Feel stronger and have more energy to do all the things you like to do.  Focus better at school or work.  Feel, think, and sleep better.  Reach and stay at a healthy weight.  Lose fat and build lean muscle.  Lower your " "risk for serious health problems, including diabetes, heart attack, high blood pressure, and some cancers.  Keep your heart, lungs, bones, muscles, and joints strong and healthy.  How can you make being active part of your life?  Start slowly. Make it your long-term goal to get at least 30 minutes of exercise on most days of the week. Walking is a good choice. You also may want to do other activities, such as running, swimming, cycling, or playing tennis or team sports.  Pick activities that you like--ones that make your heart beat faster, your muscles stronger, and your muscles and joints more flexible. If you find more than one thing you like doing, do them all. You don't have to do the same thing every day.  Get your heart pumping every day. Any activity that makes your heart beat faster and keeps it at that rate for a while counts.  Here are some great ways to get your heart beating faster:  Go for a brisk walk, run, or hike.  Go for a swim or bike ride.  Take an online exercise class or dance.  Play a game of touch football, basketball, or soccer.  Play tennis, pickleball, or racquetball.  Climb stairs.  Even some household chores can be aerobic. Just do them at a faster pace. Raking or mowing the lawn, sweeping the garage, and vacuuming and cleaning your home all can help get your heart rate up.  Strengthen your muscles during the week. You don't have to lift heavy weights or grow big, bulky muscles to get stronger. Doing a few simple activities that make your muscles work against, or \"resist,\" something can help you get stronger. Aim for at least twice a week.  For example, you can:  Do push-ups or sit-ups, which use your own body weight as resistance.  Lift weights or dumbbells or use stretch bands at home or in a gym or community center.  Stretch your muscles often. Stretching will help you as you become more active. It can help you stay flexible and loosen tight muscles. It can also help improve your " "balance and posture and can be a great way to relax.  Be sure to stretch the muscles you'll be using when you work out. It's best to warm your muscles slightly before you stretch them. Walk or do some other light aerobic activity for a few minutes. Then start stretching.  When you stretch your muscles:  Do it slowly. Stretching is not about going fast or making sudden movements.  Don't push or bounce during a stretch.  Hold each stretch for at least 15 to 30 seconds, if you can. You should feel a stretch in the muscle, but not pain.  Breathe out as you do the stretch. Then breathe in as you hold the stretch. Don't hold your breath.  If you're worried about how more activity might affect your health, have a checkup before you start. Follow any special advice your doctor gives you for getting a smart start.  Where can you learn more?  Go to https://www.JETME.net/patiented  Enter W332 in the search box to learn more about \"Learning About Being Physically Active.\"  Current as of: June 5, 2023  Content Version: 14.2 2024 [facility] [employer].   Care instructions adapted under license by your healthcare professional. If you have questions about a medical condition or this instruction, always ask your healthcare professional. Healthwise, Incorporated disclaims any warranty or liability for your use of this information.       "
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
## 2025-01-10 DIAGNOSIS — F07.81 POST CONCUSSION SYNDROME: ICD-10-CM

## 2025-01-10 DIAGNOSIS — E78.5 DYSLIPIDEMIA (HIGH LDL; LOW HDL): ICD-10-CM

## 2025-01-13 RX ORDER — ATORVASTATIN CALCIUM 10 MG/1
10 TABLET, FILM COATED ORAL DAILY
Qty: 90 TABLET | Refills: 0 | Status: SHIPPED | OUTPATIENT
Start: 2025-01-13

## 2025-01-13 RX ORDER — CYCLOBENZAPRINE HCL 10 MG
10 TABLET ORAL 3 TIMES DAILY PRN
Qty: 90 TABLET | Refills: 0 | Status: SHIPPED | OUTPATIENT
Start: 2025-01-13

## 2025-03-05 ENCOUNTER — VIRTUAL VISIT (OUTPATIENT)
Dept: FAMILY MEDICINE | Facility: CLINIC | Age: 60
End: 2025-03-05
Payer: COMMERCIAL

## 2025-03-05 DIAGNOSIS — B96.89 BACTERIAL SINUSITIS: Primary | ICD-10-CM

## 2025-03-05 DIAGNOSIS — J32.9 BACTERIAL SINUSITIS: Primary | ICD-10-CM

## 2025-03-05 PROCEDURE — 98005 SYNCH AUDIO-VIDEO EST LOW 20: CPT

## 2025-03-05 ASSESSMENT — ENCOUNTER SYMPTOMS: SORE THROAT: 1

## 2025-03-05 NOTE — PROGRESS NOTES
"Shon is a 59 year old who is being evaluated via a billable video visit.    How would you like to obtain your AVS? MyChart  If the video visit is dropped, the invitation should be resent by: Text to cell phone:[phone]  Will anyone else be joining your video visit? No      Assessment & Plan     Bacterial sinusitis  Treating with first line abx given symptoms > 10 days given his primary symptoms are sinus pain/drainage, headache, fatigue. However unabel to complete physical exam or vitals today so advised if not improving, would want to follow up as augmentin would not treat atypical pna.   - amoxicillin-clavulanate (AUGMENTIN) 875-125 MG tablet  Dispense: 14 tablet; Refill: 0            BMI  Estimated body mass index is 28.68 kg/m  as calculated from the following:    Height as of 11/4/24: 1.842 m (6' 0.5\").    Weight as of 11/4/24: 97.3 kg (214 lb 6.4 oz).         1. Start augmentin (antibiotic) twice daily for 7 days. Often causes some stomach upset or loose stools. Take with food to minimize stomach aches, take probiotic supplement or include probiotic foods in your diet.   - this treats bacterial sinus infections or would treat some types of pneumonia    2. If ongoing worsening of symptoms by Friday, call nurse line.   If not improving by Monday, call nurse line.   - may need different antibiotic which would treat atypical pneumonia.     Subjective   Shon is a 59 year old, presenting for the following health issues:  Pharyngitis        3/5/2025     7:01 AM   Additional Questions   Roomed by Nasreen     History of Present Illness       Reason for visit:  Possible sinus infection  Symptom onset:  1-2 weeks ago  Symptoms include:  Sore throat, fatigue, swollen glands  Symptom intensity:  Moderate  Symptom progression:  Staying the same         Had influenza A in January.   Past week has been sleeping all the time              Review of Systems  Constitutional, HEENT, cardiovascular, pulmonary, gi and gu systems " are negative, except as otherwise noted.      Objective           Vitals:  No vitals were obtained today due to virtual visit.    Physical Exam   GENERAL: alert and no distress  EYES: Eyes grossly normal to inspection.  No discharge or erythema, or obvious scleral/conjunctival abnormalities.  RESP: No audible wheeze, cough, or visible cyanosis.    SKIN: Visible skin clear. No significant rash, abnormal pigmentation or lesions.  NEURO: Cranial nerves grossly intact.  Mentation and speech appropriate for age.  PSYCH: Appropriate affect, tone, and pace of words          Video-Visit Details    Type of service:  Video Visit   Originating Location (pt. Location): Home    Distant Location (provider location):  Off-site  Platform used for Video Visit: Amol  Signed Electronically by: BONNIE Pineda CNP

## 2025-03-05 NOTE — PATIENT INSTRUCTIONS
Derek Menendez,   Here is a summary of the plan we discussed.   1. Start augmentin (antibiotic) twice daily for 7 days. Often causes some stomach upset or loose stools. Take with food to minimize stomach aches, take probiotic supplement or include probiotic foods in your diet.   - this treats bacterial sinus infections or would treat some types of pneumonia    2. If ongoing worsening of symptoms by Friday, call nurse line.   If not improving by Monday, call nurse line.   - may need different antibiotic which would treat atypical pneumonia.     Patient Education     OVER THE COUNTER MEDs and COMFORT MEASURES  NON-Medications  Push hydration (you should be annoyed about peeing frequently)  Hot water + honey, sip on throughout the day  Cool mist humidifier, at bedside   Steam shower for 10-15 minutes 2-3 times per day for comfort.  Nasal saline spray, ok to use as much as every 1 hour  If you take any herbal supplements make sure they are from a reputable company with 3rd party testing.     Mucinex (guaifenesin) 1200 mg twice per day - increase hydration/water intake and take with full glass of water, this helps keep mucus/phlegm thin so you can clear it more easily. Take for 7 days.     Acetaminophen  (aka Tylenol) 1000 mg up to 3 x per day   [Fever, muscle aches, head/sinus pain, chest tightness/soreness]  (Do not take if you have liver disease, avoid alcohol while taking).  I recommend alternating acetaminophen / ibuprofen every 4 hours    Ibuprofen (aka motrin, Advil) 200-800 mg up to 3 x per day  [Fever, inflammation, congestion, pain/soreness].  (Do not take if you have kidney disease).   If you have heartburn/reflux, take blood thinners, or take a selective serotonin reuptake inhibitor, you should take over the counter famotidine (pepcid) or pantoprazole 20 mg daily (if already taking can increase dose) while you are taking ibuprofen regularly to prevent worsening.    Fluticasone (flonase) nasal spray one time daily  (especially for ear pain)  OK to continue other over the counter allergy meds      Thanks again, hope you are recovering quickly! Let me know if any concerns.     Michael

## 2025-03-06 DIAGNOSIS — F07.81 POST CONCUSSION SYNDROME: ICD-10-CM

## 2025-03-06 RX ORDER — CYCLOBENZAPRINE HCL 10 MG
10 TABLET ORAL 3 TIMES DAILY PRN
Qty: 90 TABLET | Refills: 0 | Status: SHIPPED | OUTPATIENT
Start: 2025-03-06

## 2025-04-12 DIAGNOSIS — I10 ESSENTIAL HYPERTENSION: ICD-10-CM

## 2025-04-12 DIAGNOSIS — F07.81 POST CONCUSSION SYNDROME: ICD-10-CM

## 2025-04-14 RX ORDER — METOPROLOL SUCCINATE 50 MG/1
50 TABLET, EXTENDED RELEASE ORAL DAILY
Qty: 90 TABLET | Refills: 0 | Status: SHIPPED | OUTPATIENT
Start: 2025-04-14

## 2025-04-14 RX ORDER — CYCLOBENZAPRINE HCL 10 MG
10 TABLET ORAL 3 TIMES DAILY PRN
Qty: 90 TABLET | Refills: 0 | Status: SHIPPED | OUTPATIENT
Start: 2025-04-14

## 2025-04-14 NOTE — TELEPHONE ENCOUNTER
Please call patient, signed 3 month refill but would like to see him in clinic for follow up for next refill since we changed BP meds in November. Advise to schedule annual if due, or med recheck/follow up appointment.

## 2025-04-15 NOTE — TELEPHONE ENCOUNTER
Called and spoke to patients significant other and relayed Michael Hatfield message below.    JOSE ANGEL Lai  [facility]

## 2025-05-12 ENCOUNTER — OFFICE VISIT (OUTPATIENT)
Dept: FAMILY MEDICINE | Facility: CLINIC | Age: 60
End: 2025-05-12
Payer: COMMERCIAL

## 2025-05-12 VITALS
SYSTOLIC BLOOD PRESSURE: 134 MMHG | BODY MASS INDEX: 27.62 KG/M2 | DIASTOLIC BLOOD PRESSURE: 83 MMHG | TEMPERATURE: 98.1 F | HEART RATE: 64 BPM | HEIGHT: 73 IN | WEIGHT: 208.4 LBS | RESPIRATION RATE: 18 BRPM | OXYGEN SATURATION: 97 %

## 2025-05-12 DIAGNOSIS — S06.9X0S TRAUMATIC BRAIN INJURY, WITHOUT LOSS OF CONSCIOUSNESS, SEQUELA: ICD-10-CM

## 2025-05-12 DIAGNOSIS — I10 ESSENTIAL HYPERTENSION: Primary | Chronic | ICD-10-CM

## 2025-05-12 DIAGNOSIS — E78.5 HYPERLIPIDEMIA LDL GOAL <100: ICD-10-CM

## 2025-05-12 DIAGNOSIS — R51.9 NONINTRACTABLE EPISODIC HEADACHE, UNSPECIFIED HEADACHE TYPE: Chronic | ICD-10-CM

## 2025-05-12 DIAGNOSIS — F41.1 GENERALIZED ANXIETY DISORDER: Chronic | ICD-10-CM

## 2025-05-12 DIAGNOSIS — R79.89 HIGH SERUM VITAMIN D: ICD-10-CM

## 2025-05-12 LAB
ANION GAP SERPL CALCULATED.3IONS-SCNC: 9 MMOL/L (ref 7–15)
BUN SERPL-MCNC: 11.8 MG/DL (ref 8–23)
CALCIUM SERPL-MCNC: 10 MG/DL (ref 8.8–10.4)
CHLORIDE SERPL-SCNC: 104 MMOL/L (ref 98–107)
CHOLEST SERPL-MCNC: 158 MG/DL
CREAT SERPL-MCNC: 0.91 MG/DL (ref 0.67–1.17)
EGFRCR SERPLBLD CKD-EPI 2021: >90 ML/MIN/1.73M2
FASTING STATUS PATIENT QL REPORTED: YES
FASTING STATUS PATIENT QL REPORTED: YES
GLUCOSE SERPL-MCNC: 86 MG/DL (ref 70–99)
HCO3 SERPL-SCNC: 28 MMOL/L (ref 22–29)
HDLC SERPL-MCNC: 50 MG/DL
LDLC SERPL CALC-MCNC: 83 MG/DL
NONHDLC SERPL-MCNC: 108 MG/DL
POTASSIUM SERPL-SCNC: 5.2 MMOL/L (ref 3.4–5.3)
SODIUM SERPL-SCNC: 141 MMOL/L (ref 135–145)
TRIGL SERPL-MCNC: 126 MG/DL
VIT D+METAB SERPL-MCNC: 73 NG/ML (ref 20–50)

## 2025-05-12 PROCEDURE — 80061 LIPID PANEL: CPT

## 2025-05-12 PROCEDURE — 99214 OFFICE O/P EST MOD 30 MIN: CPT

## 2025-05-12 PROCEDURE — 3079F DIAST BP 80-89 MM HG: CPT

## 2025-05-12 PROCEDURE — 80048 BASIC METABOLIC PNL TOTAL CA: CPT

## 2025-05-12 PROCEDURE — G2211 COMPLEX E/M VISIT ADD ON: HCPCS

## 2025-05-12 PROCEDURE — 82306 VITAMIN D 25 HYDROXY: CPT

## 2025-05-12 PROCEDURE — 36415 COLL VENOUS BLD VENIPUNCTURE: CPT

## 2025-05-12 PROCEDURE — 3075F SYST BP GE 130 - 139MM HG: CPT

## 2025-05-12 RX ORDER — PROPRANOLOL HYDROCHLORIDE 80 MG/1
80 CAPSULE, EXTENDED RELEASE ORAL DAILY
Qty: 30 CAPSULE | Refills: 2 | Status: SHIPPED | OUTPATIENT
Start: 2025-05-12

## 2025-05-12 RX ORDER — VERAPAMIL HYDROCHLORIDE 240 MG/1
240 TABLET, FILM COATED, EXTENDED RELEASE ORAL DAILY
Qty: 90 TABLET | Refills: 1 | Status: SHIPPED | OUTPATIENT
Start: 2025-05-12

## 2025-05-12 RX ORDER — NORTRIPTYLINE HYDROCHLORIDE 50 MG/1
50 CAPSULE ORAL AT BEDTIME
Qty: 90 CAPSULE | Refills: 1 | Status: SHIPPED | OUTPATIENT
Start: 2025-05-12

## 2025-05-12 ASSESSMENT — PATIENT HEALTH QUESTIONNAIRE - PHQ9
SUM OF ALL RESPONSES TO PHQ QUESTIONS 1-9: 0
SUM OF ALL RESPONSES TO PHQ QUESTIONS 1-9: 0

## 2025-05-12 NOTE — PATIENT INSTRUCTIONS
STOP taking metoprolol   START taking propranolol instead.   30 day supply with 2 refills, if you don't feel as good with propranolol then we can switch back to metoprolol.    Follow up video visit in 3-4 weeks.  Refilled other meds for 6 months.   Follow up in 6 months, sooner if new concerns.

## 2025-05-12 NOTE — PROGRESS NOTES
{PROVIDER CHARTING PREFERENCE:660510}            Fede Menendez is a 59 year old, presenting for the following health issues:  Recheck Medication        5/12/2025    11:40 AM   Additional Questions   Roomed by Shaun MITCHELL MA   Accompanied by Self     History of Present Illness       Reason for visit:  Med check?   He is taking medications regularly.      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     Wt Readings from Last 5 Encounters:   05/12/25 94.5 kg (208 lb 6.4 oz)   11/04/24 97.3 kg (214 lb 6.4 oz)   02/15/24 108.3 kg (238 lb 12.8 oz)   01/11/23 107 kg (236 lb)   07/07/22 113.9 kg (251 lb 0.8 oz)      Hypertension Follow-up    METOPROLOL - daily  VERAPAMIL - daily, headaches from TBI  NORTRIPTYLINE - daily, headaches from TBI  FLEXERIL - takes at bedtime, occasionally takes during daytime. Used to make him tired/drowsy hangover, never causes         Do you check your blood pressure regularly outside of the clinic? Yes   Are you following a low salt diet? No  Are your blood pressures ever more than 140 on the top number (systolic) OR more   than 90 on the bottom number (diastolic), for example 140/90? No    BP Readings from Last 2 Encounters:   05/12/25 (!) 142/94   11/04/24 118/83     How many servings of fruits and vegetables do you eat daily?  4 or more  On average, how many sweetened beverages do you drink each day (Examples: soda, juice, sweet tea, etc.  Do NOT count diet or artificially sweetened beverages)?   0, end of week, will have just one other wise none  How many days per week do you exercise enough to make your heart beat faster? 7  How many minutes a day do you exercise enough to make your heart beat faster? 30 - 60  How many days per week do you miss taking your medication? 0    {additonal problems for provider to add (Optional):055557}    {ROS Picklists (Optional):238575}      Objective    BP (!) 142/94 (BP  "Location: Left arm, Patient Position: Chair, Cuff Size: Adult Large)   Pulse 63   Temp 98.1  F (36.7  C) (Oral)   Resp 18   Ht 1.842 m (6' 0.5\")   Wt 94.5 kg (208 lb 6.4 oz)   SpO2 97%   BMI 27.88 kg/m    Body mass index is 27.88 kg/m .  Physical Exam   {Exam List (Optional):752130}    {Diagnostic Test Results (Optional):731371}        Signed Electronically by: BONNIE Pineda CNP  {Email feedback regarding this note to [email or web address]   :[id number]}  " "Position: Chair, Cuff Size: Adult Large)   Pulse 63   Temp 98.1  F (36.7  C) (Oral)   Resp 18   Ht 1.842 m (6' 0.5\")   Wt 94.5 kg (208 lb 6.4 oz)   SpO2 97%   BMI 27.88 kg/m    Body mass index is 27.88 kg/m .  Physical Exam   GENERAL: alert and no distress  NECK: no adenopathy, no asymmetry, masses, or scars  RESP: lungs clear to auscultation - no rales, rhonchi or wheezes  CV: regular rate and rhythm, normal S1 S2, no S3 or S4, no murmur, click or rub, no peripheral edema  ABDOMEN: soft, nontender, no hepatosplenomegaly, no masses and bowel sounds normal  MS: no gross musculoskeletal defects noted, no edema    Results for orders placed or performed in visit on 05/12/25   Lipid panel reflex to direct LDL Fasting     Status: None   Result Value Ref Range    Cholesterol 158 <200 mg/dL    Triglycerides 126 <150 mg/dL    Direct Measure HDL 50 >=40 mg/dL    LDL Cholesterol Calculated 83 <100 mg/dL    Non HDL Cholesterol 108 <130 mg/dL    Patient Fasting > 8hrs? Yes     Narrative    Cholesterol  Desirable: < 200 mg/dL  Borderline High: 200 - 239 mg/dL  High: >= 240 mg/dL    Triglycerides  Normal: < 150 mg/dL  Borderline High: 150 - 199 mg/dL  High: 200-499 mg/dL  Very High: >= 500 mg/dL    Direct Measure HDL  Female: >= 50 mg/dL   Male: >= 40 mg/dL    LDL Cholesterol  Desirable: < 100 mg/dL  Above Desirable: 100 - 129 mg/dL   Borderline High: 130 - 159 mg/dL   High:  160 - 189 mg/dL   Very High: >= 190 mg/dL    Non HDL Cholesterol  Desirable: < 130 mg/dL  Above Desirable: 130 - 159 mg/dL  Borderline High: 160 - 189 mg/dL  High: 190 - 219 mg/dL  Very High: >= 220 mg/dL   Basic metabolic panel  (Ca, Cl, CO2, Creat, Gluc, K, Na, BUN)     Status: None   Result Value Ref Range    Sodium 141 135 - 145 mmol/L    Potassium 5.2 3.4 - 5.3 mmol/L    Chloride 104 98 - 107 mmol/L    Carbon Dioxide (CO2) 28 22 - 29 mmol/L    Anion Gap 9 7 - 15 mmol/L    Urea Nitrogen 11.8 8.0 - 23.0 mg/dL    Creatinine 0.91 0.67 - 1.17 mg/dL    " GFR Estimate >90 >60 mL/min/1.73m2    Calcium 10.0 8.8 - 10.4 mg/dL    Glucose 86 70 - 99 mg/dL    Patient Fasting > 8hrs? Yes    Vitamin D Deficiency     Status: Abnormal   Result Value Ref Range    Vitamin D, Total (25-Hydroxy) 73 (H) 20 - 50 ng/mL    Narrative    Season, race, dietary intake, and treatment affect the concentration of 25-hydroxy-Vitamin D. Values may decrease during winter months and increase during summer months.    Vitamin D determination is routinely performed by an immunoassay specific for 25 hydroxyvitamin D3.  If an individual is on vitamin D2(ergocalciferol) supplementation, please specify 25 OH vitamin D2 and D3 level determination by LCMSMS test VITD23.           The longitudinal plan of care for the diagnosis(es)/condition(s) as documented were addressed during this visit. Due to the added complexity in care, I will continue to support Shon in the subsequent management and with ongoing continuity of care.     Signed Electronically by: BONNIE Pineda CNP

## 2025-05-13 ENCOUNTER — RESULTS FOLLOW-UP (OUTPATIENT)
Dept: FAMILY MEDICINE | Facility: CLINIC | Age: 60
End: 2025-05-13

## 2025-05-15 ENCOUNTER — PATIENT OUTREACH (OUTPATIENT)
Dept: CARE COORDINATION | Facility: CLINIC | Age: 60
End: 2025-05-15
Payer: COMMERCIAL

## 2025-06-19 DIAGNOSIS — F07.81 POST CONCUSSION SYNDROME: ICD-10-CM

## 2025-06-22 RX ORDER — CYCLOBENZAPRINE HCL 10 MG
10 TABLET ORAL 3 TIMES DAILY PRN
Qty: 90 TABLET | Refills: 0 | Status: SHIPPED | OUTPATIENT
Start: 2025-06-22

## 2025-07-10 DIAGNOSIS — F07.81 POST CONCUSSION SYNDROME: ICD-10-CM

## 2025-07-10 RX ORDER — CYCLOBENZAPRINE HCL 10 MG
10 TABLET ORAL 3 TIMES DAILY PRN
Qty: 90 TABLET | Refills: 0 | Status: SHIPPED | OUTPATIENT
Start: 2025-07-10

## 2025-07-22 ENCOUNTER — MYC REFILL (OUTPATIENT)
Dept: FAMILY MEDICINE | Facility: CLINIC | Age: 60
End: 2025-07-22
Payer: COMMERCIAL

## 2025-07-22 DIAGNOSIS — R51.9 NONINTRACTABLE EPISODIC HEADACHE, UNSPECIFIED HEADACHE TYPE: Chronic | ICD-10-CM

## 2025-07-22 DIAGNOSIS — I10 ESSENTIAL HYPERTENSION: Chronic | ICD-10-CM

## 2025-07-22 DIAGNOSIS — F41.1 GENERALIZED ANXIETY DISORDER: Chronic | ICD-10-CM

## 2025-07-23 RX ORDER — PROPRANOLOL HYDROCHLORIDE 80 MG/1
80 CAPSULE, EXTENDED RELEASE ORAL DAILY
Qty: 30 CAPSULE | Refills: 2 | Status: SHIPPED | OUTPATIENT
Start: 2025-07-23